# Patient Record
Sex: FEMALE | Race: BLACK OR AFRICAN AMERICAN
[De-identification: names, ages, dates, MRNs, and addresses within clinical notes are randomized per-mention and may not be internally consistent; named-entity substitution may affect disease eponyms.]

---

## 2017-01-06 ENCOUNTER — HOSPITAL ENCOUNTER (EMERGENCY)
Dept: HOSPITAL 17 - NED | Age: 27
LOS: 1 days | Discharge: LEFT BEFORE DISCHARGE | End: 2017-01-07
Payer: MEDICAID

## 2017-01-06 VITALS
DIASTOLIC BLOOD PRESSURE: 110 MMHG | TEMPERATURE: 98.3 F | RESPIRATION RATE: 16 BRPM | HEART RATE: 97 BPM | OXYGEN SATURATION: 97 % | SYSTOLIC BLOOD PRESSURE: 135 MMHG

## 2017-01-06 DIAGNOSIS — J35.9: Primary | ICD-10-CM

## 2017-01-06 PROCEDURE — 99281 EMR DPT VST MAYX REQ PHY/QHP: CPT

## 2017-02-06 ENCOUNTER — HOSPITAL ENCOUNTER (EMERGENCY)
Dept: HOSPITAL 17 - NEPB | Age: 27
Discharge: HOME | End: 2017-02-06
Payer: MEDICAID

## 2017-02-06 VITALS
OXYGEN SATURATION: 98 % | SYSTOLIC BLOOD PRESSURE: 119 MMHG | TEMPERATURE: 98.2 F | HEART RATE: 96 BPM | DIASTOLIC BLOOD PRESSURE: 64 MMHG | RESPIRATION RATE: 18 BRPM

## 2017-02-06 VITALS — HEIGHT: 59 IN | BODY MASS INDEX: 44.44 KG/M2 | WEIGHT: 220.46 LBS

## 2017-02-06 DIAGNOSIS — B34.9: ICD-10-CM

## 2017-02-06 DIAGNOSIS — J02.9: ICD-10-CM

## 2017-02-06 DIAGNOSIS — O26.891: Primary | ICD-10-CM

## 2017-02-06 PROCEDURE — 87081 CULTURE SCREEN ONLY: CPT

## 2017-02-06 PROCEDURE — 87880 STREP A ASSAY W/OPTIC: CPT

## 2017-02-06 PROCEDURE — 99283 EMERGENCY DEPT VISIT LOW MDM: CPT

## 2017-02-06 NOTE — PD
HPI


Chief Complaint:  ENT Complaint


Time Seen by Provider:  10:21


Travel History


International Travel<30 days:  No


Contact w/Intl Traveler<30days:  No


Traveled to known affect area:  No





History of Present Illness


HPI


25-year-old female, 13 weeks pregnant, presents to the emergency Department 

with complaint of sore throat 2 days.  Reports nasal congestion.  Denies fever

, chills.  Denies ear pain or cough.  Denies headache or abdominal pain.  

Denies lump in throat, difficulty swallowing, unusual drooling.  Reports 

painful swallowing.  Reports vomiting secondary to pregnancy.  Denies abdominal 

pain, cramping; vaginal discharge or bleeding.  Has not taken any medications 

or tried any treatments to alleviate her symptoms.  No known relieving factors.

  No known allergies.  Denies significant past medical history.  Is currently 

taking Flagyl for a vaginal infection that was prescribed by her OB/GYN/PCP.  OB

/GYN and PCP is Dr. Andrea.  No other modifying factors or associated signs 

and symptoms.





PFSH


Past Medical History


Medical History:  Denies Significant Hx


Diabetes:  No


Immune Disorder:  No


:  1


Para:  1


Miscarriage:  0


:  0





Past Surgical History


Eye Surgery:  Yes (TEAR DUCT)





Social History


Alcohol Use:  No


Tobacco Use:  No


Substance Use:  No





Allergies-Medications


(Allergen,Severity, Reaction):  


Coded Allergies:  


     No Known Allergies (Unverified , 17)


Reported Meds & Prescriptions





Reported Meds & Active Scripts


Active


Metronidazole 500 Mg Tab 500 Mg PO BID


Reported


Gummi Bear Multivitamin/M (Pediatric Multiple Vitamin W/) 1 Chw Chw   








Review of Systems


Except as stated in HPI:  all other systems reviewed are Neg





Physical Exam


Narrative


GENERAL: Well-nourished, well-developed  female patient, in no 

acute distress


SKIN: Warm and dry.  No rash.


HEAD: Atraumatic. Normocephalic. 


EYES: Pupils equal and round at 3 mm with brisk reaction. No scleral icterus. 

No injection or drainage.  PERRLA. 


ENT: Mucosa pink and dry.  Pharynx with 1-2+ tonsils;  with erythema and edema; 

without exudate.  No Uvular edema. No uvular, palatal, or tonsillar deviation.  

Airway patent. 


EARS: Bilateral pinnae and external canals appear within normal limits. 

Bilateral tympanic membranes without  erythema, dullness or perforation..


NECK: Trachea midline.  No Anterior cervical lymphadenopathy; with tenderness.  


CARDIOVASCULAR: Regular rate and rhythm.  No murmur appreciated.  


RESPIRATORY: No accessory muscle use. Clear to auscultation. Breath sounds 

equal bilaterally. 


GASTROINTESTINAL: Abdomen soft, non-tender, nondistended. Hepatic and splenic 

margins not palpable.  Bowel sounds are active 4 quadrants.  


MUSCULOSKELETAL: No obvious deformities. No clubbing.  No cyanosis.  No edema. 


NEUROLOGICAL: Awake and alert.  Oriented 3.  No obvious cranial nerve 

deficits.  Motor grossly within normal limits. Normal speech.  Moves all 

extremities. 


PSYCHIATRIC: Appropriate mood and affect; insight and judgment normal.





Data


Data


Last Documented VS





Vital Signs








  Date Time  Temp Pulse Resp B/P Pulse Ox O2 Delivery O2 Flow Rate FiO2


 


17 09:55 98.2 96 18 119/64 98 Room Air  








Orders








 Group A Rapid Strep Screen (17 10:24)


Strep Culture (Group A) (17 10:28)











MDM


Medical Decision Making


Medical Screen Exam Complete:  Yes


Emergency Medical Condition:  Yes


Medical Record Reviewed:  Yes


Differential Diagnosis


Viral pharyngitis, strep pharyngitis, viral illness


Narrative Course


26-year-old female that reports being 13 weeks pregnant presents with complaint 

of sore throat.  Afebrile in the ER.  Patient denies fever, chills.  Denies 

lump in throat, unusual drooling, difficulty swallowing.  Oropharynx is 

erythematous and mildly edematous without exudate.  Denies cough.  Centor Score

:  Using Centor score for management of sore throat the patient's risk of GABHS 

pharyngitis is Score 3=risk of GABHS 28-35%=perform rapid strep swab.  Rapid 

strep ordered.


1046:  Rapid strep negative.  Discussed symptom treatment with patient and she 

verbalized understanding and agreement with treatment plan.  Patient is 

medically cleared and stable for discharge.  Discussed reasons to return to the 

emergency department.  Instructed patient to follow up with primary care 

provider.  Patient agrees with treatment plan.  The patients vital signs are 

stable and the patient is stable for outpatient follow-up and treatment.  

Patient discharged home, stable and in no acute distress.





Diagnosis





 Primary Impression:  


 Viral pharyngitis


Referrals:  


Primary Care Physician


Patient Instructions:  General Instructions, Pharyngitis (ED)


Departure Forms:  Tests/Procedures, Work Release   Enter return to work date:  

2017





***Additional Instructions:


Get plenty of sleep/rest


Rest your voice


Drink plenty of fluids to prevent dehydration


Use warm saltwater gargles to soothe throat pain


Use an air humidifier/turn off ceiling fans


Use throat lozenges as needed for sore throat


Use ibuprofen or acetaminophen as needed to relieve pain and fever


Follow-up with your primary care provider within 2-4 days


Return immediately to the emergency department with worsening of symptoms


***Med/Other Pt SpecificInfo:  Prescription(s) given


Disposition:  01 DISCHARGE HOME


Condition:  Stable








Kamilla Escamilla 2017 10:24

## 2017-02-07 ENCOUNTER — HOSPITAL ENCOUNTER (OUTPATIENT)
Dept: HOSPITAL 17 - HPND | Age: 27
End: 2017-02-07
Attending: FAMILY MEDICINE
Payer: MEDICAID

## 2017-02-07 DIAGNOSIS — Z34.90: Primary | ICD-10-CM

## 2017-02-07 PROCEDURE — 76813 OB US NUCHAL MEAS 1 GEST: CPT

## 2017-02-07 PROCEDURE — 36416 COLLJ CAPILLARY BLOOD SPEC: CPT

## 2017-03-03 ENCOUNTER — HOSPITAL ENCOUNTER (EMERGENCY)
Dept: HOSPITAL 17 - NEPB | Age: 27
Discharge: HOME | End: 2017-03-03
Payer: MEDICAID

## 2017-03-03 VITALS
TEMPERATURE: 98 F | DIASTOLIC BLOOD PRESSURE: 77 MMHG | SYSTOLIC BLOOD PRESSURE: 131 MMHG | RESPIRATION RATE: 18 BRPM | OXYGEN SATURATION: 99 % | HEART RATE: 98 BPM

## 2017-03-03 VITALS — BODY MASS INDEX: 45.33 KG/M2 | HEIGHT: 59 IN | WEIGHT: 224.87 LBS

## 2017-03-03 DIAGNOSIS — J06.9: Primary | ICD-10-CM

## 2017-03-03 PROCEDURE — 87804 INFLUENZA ASSAY W/OPTIC: CPT

## 2017-03-03 PROCEDURE — 99283 EMERGENCY DEPT VISIT LOW MDM: CPT

## 2017-03-03 NOTE — PD
HPI


Chief Complaint:  Cold / Flu Symptoms


Time Seen by Provider:  04:23


Travel History


International Travel<30 days:  No


Contact w/Intl Traveler<30days:  No


Traveled to known affect area:  No





History of Present Illness


HPI


26-year-old white female who is 16 weeks pregnant presents with complaints of 

runny nose, cough and congestion for last 2-3 days.  She states that the 

coughing causing her to feel nauseous and one of vomiting.  She does state that 

she does normally have nausea due to her pregnancy.  She is not taking any 

medications currently.  She denies any shortness of breath or wheezing.  No 

abdominal pain or vaginal bleeding.  No leakage of fluid.  No dysuria or 

frequency.  No myalgias or arthralgias.





PFSH


Past Medical History


Medical History:  Denies Significant Hx


Diabetes:  No


Diminished Hearing:  No


Immune Disorder:  No


Tetanus Vaccination:  < 5 Years


Pregnant?:  Pregnant


LMP:  16


:  1


Para:  1


Miscarriage:  0


:  0





Past Surgical History


Eye Surgery:  Yes (TEAR DUCT)





Social History


Alcohol Use:  No


Tobacco Use:  No


Substance Use:  No





Allergies-Medications


(Allergen,Severity, Reaction):  


Coded Allergies:  


     No Known Allergies (Unverified , 3/3/17)


Reported Meds & Prescriptions





Reported Meds & Active Scripts


Active


Reported


Gummi Bear Multivitamin/M (Pediatric Multiple Vitamin W/) 1 Chw Chw   








Review of Systems


Except as stated in HPI:  all other systems reviewed are Neg





Physical Exam


Narrative


GENERAL:  Well-developed, well-nourished in no acute distress. Nontoxic 

appearing.


HEAD: Normocephalic, no pain on percussion of the sinuses


EYES: Pupils equal round and reactive. Extraocular motions intact. No scleral 

icterus. No injection or drainage. 


ENT: TMs clear without erythema.  The external auditory canals clear.  Nose: 

clear nasal discharge with edema to the turbinates.  Posterior pharynx is pink 

and moist.  No tonsillar edema or exudate.  Uvula midline. Airway patent.


NECK: Trachea midline.Supple, nontender, moves head freely.  No central bony 

tenderness or spasm.


CARDIOVASCULAR: Regular rate and rhythm without murmurs, gallops, or rubs. 


RESPIRATORY: Clear to auscultation. Breath sounds equal bilaterally. No wheezes

, rales, or rhonchi.  


GASTROINTESTINAL: Abdomen soft, obese, non-tender, nondistended. No hepato-

splenomegaly, or palpable masses. No guarding.


EXTREMITIES: No clubbing, cyanosis, or edema. No joint tenderness, effusion, or 

edema noted. 


BACK: Nontender without deformity or crepitance. No flank tenderness.





Data


Data


Last Documented VS





Vital Signs








  Date Time  Temp Pulse Resp B/P Pulse Ox O2 Delivery O2 Flow Rate FiO2


 


3/3/17 02:45 98.0 98 18 131/77 99 Room Air  








Orders





 Influenzae A/B Antigen (3/3/17 03:32)








MDM


Medical Decision Making


Medical Screen Exam Complete:  Yes


Emergency Medical Condition:  Yes


Medical Record Reviewed:  Yes


Interpretation(s)


Influenza: Negative


Differential Diagnosis


MDM: High


Differential diagnoses: Pneumonia, bronchitis, URI, asthma, RAD, legionnaire's 

disease, SARS, ARDS, influenza, bronchiolitis, RSV,PE,CHF


Narrative Course


Patient's influenza is negative.  Her symptoms are consistent with upper 

respiratory tract infection.  This is viral in etiology.





Diagnosis





 Primary Impression:  


 Viral URI with cough


Patient Instructions:  General Instructions





***Additional Instructions:


Rest.


Increase fluids.


Tylenol for fever.


Sudafed.


Benadryl for nausea.


Follow-up with your OB doctor on Monday.


Return to the ER for emergencies.


***Med/Other Pt SpecificInfo:  No Meds Exist/No RX given


Disposition:   DISCHARGE HOME


Condition:  Stable








Tc Fang Mar 3, 2017 04:28

## 2017-03-13 ENCOUNTER — HOSPITAL ENCOUNTER (OUTPATIENT)
Dept: HOSPITAL 17 - HPND | Age: 27
End: 2017-03-13
Attending: FAMILY MEDICINE
Payer: MEDICAID

## 2017-03-13 DIAGNOSIS — O99.212: Primary | ICD-10-CM

## 2017-03-13 DIAGNOSIS — E66.01: ICD-10-CM

## 2017-03-13 DIAGNOSIS — Z3A.00: ICD-10-CM

## 2017-03-13 DIAGNOSIS — O34.212: ICD-10-CM

## 2017-03-13 PROCEDURE — 76811 OB US DETAILED SNGL FETUS: CPT

## 2017-03-17 ENCOUNTER — HOSPITAL ENCOUNTER (EMERGENCY)
Dept: HOSPITAL 17 - NEPA | Age: 27
Discharge: HOME | End: 2017-03-17
Payer: MEDICAID

## 2017-03-17 VITALS — SYSTOLIC BLOOD PRESSURE: 124 MMHG | DIASTOLIC BLOOD PRESSURE: 68 MMHG

## 2017-03-17 VITALS
DIASTOLIC BLOOD PRESSURE: 74 MMHG | RESPIRATION RATE: 20 BRPM | SYSTOLIC BLOOD PRESSURE: 133 MMHG | TEMPERATURE: 98.5 F | HEART RATE: 98 BPM | OXYGEN SATURATION: 100 %

## 2017-03-17 VITALS — WEIGHT: 220.46 LBS | BODY MASS INDEX: 44.44 KG/M2 | HEIGHT: 59 IN

## 2017-03-17 DIAGNOSIS — M71.21: Primary | ICD-10-CM

## 2017-03-17 PROCEDURE — 93971 EXTREMITY STUDY: CPT

## 2017-03-17 NOTE — RADRPT
EXAM DATE/TIME:  03/17/2017 12:13 

 

[facility] COMPARISON:     

No previous studies available for comparison.

        

 

 

INDICATIONS :                

Right leg pain.

            

 

MEDICAL HISTORY :     

Pregnancy.      

 

SURGICAL HISTORY :         

Eye surgery.

 

ENCOUNTER:     

Initial

 

ACUITY:     

1 day

 

PAIN SCORE:      

10/10

 

LOCATION:      

Right  leg.

            

                      

 

TECHNIQUE:     

Venous ultrasound of the leg was performed from the inguinal ligament to the proximal calf.  Real-stan
e, color Doppler and spectral tracing, compression and augmentation techniques were used.  

 

FINDINGS:     

There is normal compressibility of the deep venous system from the inguinal region to the proximal ca
lf.  No echogenic clot is seen in the lumen of the common femoral, femoral, popliteal, and posterior 
tibial veins.  There is a normal response of the venous system to proximal and distal augmentation an
d respiration.  

 

CONCLUSION:     No DVT.

 

 

 

 Bud Pham MD on March 17, 2017 at 12:31           

Board Certified Radiologist.

 This report was verified electronically.

## 2017-03-17 NOTE — PD
HPI


Chief Complaint:  Pain: Acute or Chronic


Time Seen by Provider:  11:33


Travel History


International Travel<30 days:  No


Contact w/Intl Traveler<30days:  No


Traveled to known affect area:  No





History of Present Illness


HPI


Patient 26-year-old female presents to the emergency room for evaluation of 

right lower extremity pain.  Patient states since yesterday she's had a painful 

spot behind her right knee which hurts her whenever she stands.  Patient states 

she's never had something like this before.  Denies any fever shortness of 

breath.  States the pain radiates up into her thigh.





PFSH


Past Medical History


Medical History:  Denies Significant Hx


Diabetes:  No


Diminished Hearing:  No


Immune Disorder:  No


Pregnant?:  Pregnant


LMP:  2016


:  1


Para:  1


Miscarriage:  0


:  0





Past Surgical History


Surgical History:  No Previous Surgery


Eye Surgery:  Yes (TEAR DUCT)





Social History


Alcohol Use:  No


Tobacco Use:  No


Substance Use:  No





Allergies-Medications


(Allergen,Severity, Reaction):  


Coded Allergies:  


     No Known Allergies (Unverified , 3/3/17)


Reported Meds & Prescriptions





Reported Meds & Active Scripts


Active


Reported


Gummi Bear Multivitamin/M (Pediatric Multiple Vitamin W/) 1 Chw Chw   








Review of Systems


Except as stated in HPI:  all other systems reviewed are Neg





Physical Exam


Narrative


GENERAL: Overweight, well-developed patient,  In no apparent distress. 


SKIN: Warm and dry.


HEAD: Normocephalic.


EYES: No scleral icterus. No injection or drainage. 


NECK: Supple, trachea midline. No JVD or lymphadenopathy.


CARDIOVASCULAR: Regular rate and rhythm without murmurs, gallops, or rubs. 


RESPIRATORY: Breath sounds equal bilaterally. No accessory muscle use.


GASTROINTESTINAL: Abdomen soft, non-tender, nondistended. 


MUSCULOSKELETAL: No cyanosis, or edema.  There is a small nonindurated area of 

the right knee in the popliteal fossa more on the medial aspect.  No overlying 

erythema.  Minimally tender to palpation.  Full nontender range of motion of 

the right knee hip and ankle.  She is ambulatory in the emergency department.  

She has an atraumatic normal left lower extremity exam.


BACK: Nontender without obvious deformity. No CVA tenderness.





Data


Data


Last Documented VS





Vital Signs








  Date Time  Temp Pulse Resp B/P Pulse Ox O2 Delivery O2 Flow Rate FiO2


 


3/17/17 14:03  85 16 124/68 100   


 


3/17/17 11:17 98.5     Room Air  








Orders





 Us Leg Venous Doppler (3/17/17 11:33)








MDM


Medical Decision Making


Medical Screen Exam Complete:  Yes


Emergency Medical Condition:  Yes


Differential Diagnosis


Baker's cyst, DVT less likely, abscess extremely likely.


Narrative Course


Given the patient has some radiation up into her thigh we'll get a DVT 

examination which is negative.  Discussed with patient's of the medic 

management return to ED criteria.  Follow-up with primary care physician.





Diagnosis





 Primary Impression:  


 Baker cyst


 Qualified Code:  M71.21 - Baker cyst, right


Disposition:  01 DISCHARGE HOME


Condition:  Stable








Prabhakar Ritter MD Mar 17, 2017 11:35

## 2017-04-04 ENCOUNTER — HOSPITAL ENCOUNTER (OUTPATIENT)
Dept: HOSPITAL 17 - HPND | Age: 27
End: 2017-04-04
Attending: FAMILY MEDICINE
Payer: MEDICAID

## 2017-04-04 DIAGNOSIS — O34.212: ICD-10-CM

## 2017-04-04 DIAGNOSIS — O99.212: Primary | ICD-10-CM

## 2017-04-04 PROCEDURE — 76816 OB US FOLLOW-UP PER FETUS: CPT

## 2017-05-08 ENCOUNTER — HOSPITAL ENCOUNTER (OUTPATIENT)
Dept: HOSPITAL 17 - HPND | Age: 27
End: 2017-05-08
Attending: FAMILY MEDICINE
Payer: MEDICAID

## 2017-05-08 DIAGNOSIS — O99.212: Primary | ICD-10-CM

## 2017-05-08 DIAGNOSIS — O35.1XX0: ICD-10-CM

## 2017-05-08 DIAGNOSIS — E66.01: ICD-10-CM

## 2017-05-08 PROCEDURE — 76816 OB US FOLLOW-UP PER FETUS: CPT

## 2017-06-05 ENCOUNTER — HOSPITAL ENCOUNTER (OUTPATIENT)
Dept: HOSPITAL 17 - HPND | Age: 27
End: 2017-06-05
Attending: FAMILY MEDICINE
Payer: MEDICAID

## 2017-06-05 DIAGNOSIS — E66.01: ICD-10-CM

## 2017-06-05 DIAGNOSIS — Z3A.30: ICD-10-CM

## 2017-06-05 DIAGNOSIS — O99.213: Primary | ICD-10-CM

## 2017-06-05 PROCEDURE — 76816 OB US FOLLOW-UP PER FETUS: CPT

## 2017-06-14 ENCOUNTER — HOSPITAL ENCOUNTER (EMERGENCY)
Dept: HOSPITAL 17 - HOBED | Age: 27
Discharge: HOME | End: 2017-06-14
Payer: MEDICAID

## 2017-06-14 VITALS — RESPIRATION RATE: 18 BRPM

## 2017-06-14 DIAGNOSIS — O47.03: Primary | ICD-10-CM

## 2017-06-14 DIAGNOSIS — O34.219: ICD-10-CM

## 2017-06-14 DIAGNOSIS — Z3A.31: ICD-10-CM

## 2017-06-14 PROCEDURE — 99283 EMERGENCY DEPT VISIT LOW MDM: CPT

## 2017-06-14 NOTE — PD
HPI


Chief Complaint


Contractions


Travel History


International Travel<30 Days:  No


Contact w/Intl Traveler<30Days:  No


Known Affected Area:  No





History of Present Illness


HPI


 at 31w 5d presents with c/o contractions.  Reports driving earlier this 

evening and feeling occasional contractions.  Denies LOF/VB.  Reports good FM.


Currently taking antibiotics for a UTI.


Para:  2


:  3


Last Menstrual Period:  2017





History


Past Medical History


Medical History:  Denies Significant Hx





Obstetric History


Obstetric History


FT C/S x 2





Past Surgical History


*** Narrative Surgical


C/S x 2





Family History


Family History:  Negative





Social History


Alcohol Use:  No


Tobacco Use:  No


Substance Abuse:  No





Allergies-Medications


(Allergen,Severity, Reaction):  


Coded Allergies:  


     No Known Allergies (Unverified , 17)


Home Meds


Active Scripts


Nitrofurantoin Macrocrystal 100 Mg [id number] Mg PO QID  #28 CAP  Ref 0


   Prov:Yvrose Andrea MD R1         17


Pyridoxine 25 Mg Tab25 Mg PO Q8HR PRN (NAUSEA OR VOMITING) #30 TAB  Ref 11


   Prov:Yvrose Andrea MD R1         5/3/17


Reported Medications


Pediatric Multiple Vitamin W/ (Gummi Bear Multivitamin/M)1 Chw Chw 


   17





Review of Systems


Except as stated in HPI:  all other systems reviewed are Neg





Physical Exam


AFVSS


Narrative


GENERAL: Well-nourished, well-developed patient.


SKIN: Warm and dry.


HEAD: Normocephalic and atraumatic.


EYES: No scleral icterus. No injection or drainage. 


ENT: No nasal drainage noted. Mucous membranes pink. Airway patent.


NECK: Supple, trachea midline. No JVD.


CARDIOVASCULAR: Regular rate and rhythm without murmurs, gallops, or rubs. 


RESPIRATORY: Breath sounds equal bilaterally. No accessory muscle use.


BREASTS: Bilateral exam showed no masses , no retractions, no nipple discharge.


ABDOMEN/GI: Abdomen soft, non-tender, bowel sounds present, no rebound, no 

guarding 


   Gravid to [-] weeks size


   Fundal Height: [-]


GENITOURINARY: 


   External Genitalia: intact and normal in appearance


   BUS glands: [-]


   Cervix: [per RN exam]


   Dilatation: [0]          


   Effacement: [0]          


   Station: [3]  


   Presentation: [-]        


   Membranes: [intact or ruptured]


   Uterine Contractions: [none]


FHT's: 


   Category: [1]   


   Baseline: [130]   


   Reactive: [yes]   


   Variability: [moderate]  


   Decels: [none]  


EXTREMITIES: No cyanosis or edema.


BACK: Nontender without obvious deformity. No CVA tenderness.


NEUROLOGICAL: Awake and alert. Motor and sensory grossly within normal limits. 

Five out of 5 muscle strength in all muscle groups. Normal speech.





MDM


Interpretation(s)


 at 31w 5d with false labor, UTI.


Plan


Continue antibiotics for UTI.   labor precautions given.  Close f/u with 

OB provider encouraged.  Increase hydration.


Return if symptoms return or worsen.  All questions answered.


Diagnosis


Diagnosis:  


 Primary Impression:  


 31 weeks gestation of pregnancy


 Additional Impressions:  


 False labor before 37 completed weeks of gestation during pregnancy in third 

trimester, antepartum


 Previous  section


Disposition:  01 DISCHARGE HOME


Condition:  Stable








Juana Arvizu MD 2017 04:43

## 2017-06-14 NOTE — PD
HPI


Chief Complaint


Contractions


Date Seen:  2017


Time Seen:  04:30 (Bubba Holder MD R1)





Travel History


International Travel<30 Days:  No


Contact w/Intl Traveler<30Days:  No


Known Affected Area:  No (Bubba Holder MD R1)





History of Present Illness


HPI


Georgette Stiles is a very pleasant 27 year old  at 31 and 5/7 weeks 

gestation who presents to the OB ED with complaints of contractions. She states 

she was picking up her boyfriend around 02:30 this AM and felt a contraction 

while in her car. She states she has felt about 6 contractions total since then 

that have been irregular. Denies LOF or VB. Reports +FM. Denies headache, 

lightheadedness, RUQ pain, visual symptoms. She was given a prescription for 

nitrofurantoin 100mg po qid for 7 days after her urine dipstick on  showed 

100+ plus protein and large leukocyte. She reports being compliant with this 

thus far.





Prenatal labs: HIV negative, syphilis negative, GC/Chlamydia negative, HepB 

negative, Rubella immune, Hgb 10.9/Hct 34.7, Pap smear negative for 

intraepithelial lesion or malignancy in 


Para:  2


:  3 (Bubba Holder MD R1)





History


Past Medical History


*** Narrative Medical


History of chlamydia, treated (Bubba Holder MD R1)





Obstetric History


Obstetric History


: 2012, 41 weeks, female,  @ [place], emergency  

secondary to nonreassuring fetal heart tones


G2: 14, 40 weeks, female, repeat  @ [place] (Bubba Holder MD 

R1)





Past Surgical History


*** Narrative Surgical


CXN x2


Tear duct surgery (Bubba Holder MD R1)





Family History


Family History:  Negative (Bubba Holder MD R1)





Social History


Alcohol Use:  No


Tobacco Use:  No


Substance Abuse:  No (Bubba Holder MD R1)





Allergies-Medications


(Allergen,Severity, Reaction):  


Coded Allergies:  


     No Known Allergies (Unverified , 17)


Home Meds


Active Scripts


Nitrofurantoin Macrocrystal 100 Mg [id number] Mg PO QID  #28 CAP  Ref 0


   Prov:Yvrose Andrea MD R1         17


Pyridoxine 25 Mg Tab25 Mg PO Q8HR PRN (NAUSEA OR VOMITING) #30 TAB  Ref 11


   Prov:Yvrose Andrea MD R1         5/3/17


Reported Medications


Pediatric Multiple Vitamin W/ (Gummi Bear Multivitamin/M)1 Chw Chw 


   17





Review of Systems


Except as stated in HPI:  all other systems reviewed are Neg (Bubba Holder MD R1)





Physical Exam


Narrative


GENERAL: Well-nourished, well-developed patient.


SKIN: Warm and dry.


HEAD: Normocephalic and atraumatic.


EYES: No scleral icterus. No injection or drainage. 


ENT: No nasal drainage noted. Mucous membranes pink. Airway patent.


NECK: Supple, trachea midline. No JVD.


CARDIOVASCULAR: Regular rate and rhythm without murmurs, gallops, or rubs. 


RESPIRATORY: Breath sounds equal bilaterally. No accessory muscle use.


ABDOMEN/GI: Abdomen soft, non-tender, bowel sounds present, no rebound, no 

guarding 


   Gravid to 32 weeks size


GENITOURINARY: 


   External Genitalia: [-]


   Cervix: posterior


   Dilatation: 0          


   Effacement: 0          


   Station: [-]  


   Presentation: [-]        


   Membranes: [-]


   Uterine Contractions: none on tocometer


FHT's: 


   Category: I   


   Baseline: 130s   


   Reactive: yes   


   Variability: mod  


   Decels: none  


EXTREMITIES: No cyanosis or edema.


BACK: Nontender without obvious deformity. No CVA tenderness.


NEUROLOGICAL: Awake and alert. Motor and sensory grossly within normal limits. 

Normal speech. (Bubba Holder MD R1)





Data


Data


Vital Signs Reviewed:  Yes (Bubba Holder MD R1)





[facility]


Medical Record Reviewed:  Yes


Plan


Very pleasant 27 year old  at 31 and 5/7 weeks gestation presents to OB ED 

with complaints of contractions.





1. IUP


- Category I fetal tracing, reassuring


- No contractions on tocometer


- Cervix closed


- VSS


- Encouraged oral hydration


- Counseled patient on signs of early labor and when to present back to the OB 

ED


- Follow up with PCP Dr. Yvrose Andrea at next routine visit (Bubba Holder MD R1)





Diagnosis


Diagnosis:  


 Primary Impression:  


 [place] [facility] contraction


Disposition:  01 DISCHARGE HOME


Condition:  Stable





Attestation


Patient seen and examined.  Agree with resident's plan. (Juana Arvizu MD)








Bubba Holder MD R1 2017 04:48


Juana Arvizu MD 2017 07:48

## 2017-06-24 ENCOUNTER — HOSPITAL ENCOUNTER (EMERGENCY)
Dept: HOSPITAL 17 - HOBED | Age: 27
Discharge: HOME | End: 2017-06-24
Payer: MEDICAID

## 2017-06-24 DIAGNOSIS — O26.893: Primary | ICD-10-CM

## 2017-06-24 DIAGNOSIS — Z3A.33: ICD-10-CM

## 2017-06-24 DIAGNOSIS — O23.43: ICD-10-CM

## 2017-06-24 DIAGNOSIS — Z79.899: ICD-10-CM

## 2017-06-24 DIAGNOSIS — M54.9: ICD-10-CM

## 2017-06-24 PROCEDURE — 59025 FETAL NON-STRESS TEST: CPT

## 2017-06-24 NOTE — PD
HPI


Chief Complaint


back pain


Date Seen:  2017


Travel History


International Travel<30 Days:  No


Contact w/Intl Traveler<30Days:  No


Known Affected Area:  No





History of Present Illness


HPI


This is a 26y/o  at 33w3d who presents to the [facility] with reports of 

intermittent back pain for the last few weeks.  The pain is usually preceded by 

2 hours of contractions.  She denies vaginal bleeding or leakage of fluid with 

reports of active fetal movements.





Prenatal care with Dr. Andrea in the resident clinic, care complicated by: 


1. Choroid plexus cyst in the fetus, since resolved.


2. Previous c/s times 2


3. current UTI on macrobid


Para:  2


:  3





History


Past Medical History


Medical History:  Denies Significant Hx





Obstetric History


Obstetric History


2012 41w Primary c/s, emergent, Female


2014 40w Repeat c/s, Female





Past Surgical History


*** Narrative Surgical


C/s times 2


Tear duct surgery as a child





Family History


Family History:  Negative





Social History


Alcohol Use:  No


Tobacco Use:  No


Substance Abuse:  No





Allergies-Medications


(Allergen,Severity, Reaction):  


Coded Allergies:  


     No Known Allergies (Unverified , 17)


Home Meds


Active Scripts


Nitrofurantoin Macrocrystal 100 Mg Lnp294 Mg PO QID  #28 CAP  Ref 0


   Prov:Yvrose Andrea MD R1         17


Pyridoxine 25 Mg Tab25 Mg PO Q8HR PRN (NAUSEA OR VOMITING) #30 TAB  Ref 11


   Prov:Yvrose Andrea MD R1         5/3/17


Reported Medications


Pediatric Multiple Vitamin W/ (Gummi Bear Multivitamin/M)1 Chw Chw 


   17





Review of Systems


Except as stated in HPI:  all other systems reviewed are Neg





Physical Exam


Narrative


GENERAL: Well-nourished, well-developed patient.


SKIN: Warm and dry.


HEAD: Normocephalic and atraumatic.


EYES: No scleral icterus. No injection or drainage. 


ENT: No nasal drainage noted. Mucous membranes pink. Airway patent.


NECK: Supple, trachea midline. No JVD.


CARDIOVASCULAR: Regular rate and rhythm without murmurs, gallops, or rubs. 


RESPIRATORY: Breath sounds equal bilaterally. No accessory muscle use.


BREASTS: Bilateral exam showed no masses , no retractions, no nipple discharge.


ABDOMEN/GI: Abdomen soft, non-tender, bowel sounds present, no rebound, no 

guarding 


   Gravid to 33 weeks size


GENITOURINARY: 


   External Genitalia: intact and normal in appearance


   VE: deferred


   Uterine Contractions: None


FHT's: 


   Category: 1  


EXTREMITIES: No cyanosis or edema.


BACK: Nontender without obvious deformity. No CVA tenderness.


NEUROLOGICAL: Awake and alert. Motor and sensory grossly within normal limits. 

Five out of 5 muscle strength in all muscle groups. Normal speech.





Data


Data


Vital Signs Reviewed:  Yes


Orders





 Vital Signs (Adult) .ON ADMISSION (17 15:01)


^ Labor Status (17 15:01)


^ Non Stress Test (17 15:01)





MDM


Medical Record Reviewed:  No


Narrative Course / MDM


This is a 26y/o  at 33w3d with back pain for a few weeks.


-reassuring fetal status


-no evidence of PTL


-currently on macrobid for UTI


Plan


-d/c home


-f/u with Dr. Andrea as scheduled tomorrow


Diagnosis


Diagnosis:  


 Primary Impression:  


 33 weeks gestation of pregnancy


 Additional Impression:  


 Back pain affecting pregnancy in third trimester


Disposition:  01 DISCHARGE HOME


Condition:  Stable


Patient Instructions:  Having Your Baby: The Labor Process (GEN), Urinary Tract 

Infection in Pregnancy (ED), Abdominal Pain in Pregnancy  (ED), General 

Instructions


Departure Forms:  Tests/Procedures








Meli Galvan MD 2017 15:11

## 2017-07-15 ENCOUNTER — HOSPITAL ENCOUNTER (EMERGENCY)
Dept: HOSPITAL 17 - HOBED | Age: 27
Discharge: HOME | End: 2017-07-15
Payer: MEDICAID

## 2017-07-15 DIAGNOSIS — R10.9: ICD-10-CM

## 2017-07-15 DIAGNOSIS — O26.893: Primary | ICD-10-CM

## 2017-07-15 DIAGNOSIS — Z3A.36: ICD-10-CM

## 2017-07-15 DIAGNOSIS — O34.219: ICD-10-CM

## 2017-07-15 PROCEDURE — 99284 EMERGENCY DEPT VISIT MOD MDM: CPT

## 2017-07-15 PROCEDURE — 96372 THER/PROPH/DIAG INJ SC/IM: CPT

## 2017-07-15 NOTE — PD
HPI


Chief Complaint


abdominal pain


Date Seen:  Jul 15, 2017


Time Seen:  17:59


Travel History


International Travel<30 Days:  No


Contact w/Intl Traveler<30Days:  No





History of Present Illness


HPI


Patient is a 27 year of  at 36 and 1/7 weeks gestation, EDC 2017, who 

presents to the [facility] with abdominal pain. She denies leakage of fluid, vaginal 

bleeding, and contractions. She feels baby moving regularly. She denies HA/N/V/D

/fever/sick contacts/SOB/calf pain/dizziness/seeing spots. OB care is with Dr. Yvrose Andrea, LOV 2017 at the [facility]. She had no complaints at the visit 

yesterday. 





She does have urine sample from visit 2017 showing 10-50K yeast, with 

recommendation to get Monistat ointment OTC for treatment. She has not done 

this yet.





History


Past Medical History


Medical History:  Denies Significant Hx





Past Surgical History


*** Narrative Surgical


Cs x 2





Family History


Family History:  Negative





Social History


Alcohol Use:  No


Tobacco Use:  No


Substance Abuse:  No





Allergies-Medications


(Allergen,Severity, Reaction):  


Coded Allergies:  


     No Known Allergies (Unverified , 17)


Home Meds


Active Scripts


Miconazole 3 Vaginal Cream (Monistat 3 Vaginal Cream)4 % Cream1 Appl VAGINAL HS

  #1 BOX  Ref 0


   Apply 1 applicator to the vaginal area at bedtime. Repeat for 3 days.


   Prov:Yvrose Andrea MD R1         17


Pyridoxine 25 Mg Tab25 Mg PO Q8HR PRN (NAUSEA OR VOMITING) #30 TAB  Ref 11


   Prov:Yvrose Andrea MD R1         5/3/17


Reported Medications


Pediatric Multiple Vitamin W/ (Gummi Bear Multivitamin/M)1 Chw Chw 


   17





Review of Systems


Except as stated in HPI:  all other systems reviewed are Neg





Physical Exam


Narrative


GENERAL: Well-nourished, well-developed patient. Obese and in no apparent 

distress.


SKIN: Warm and dry. No rashes


HEAD: Normocephalic and atraumatic.


EYES: No scleral icterus. No injection or drainage. 


ENT: No nasal drainage noted. Mucous membranes pink. Airway patent.


NECK: Supple, trachea midline. No JVD.


CARDIOVASCULAR: Regular rate and rhythm without murmurs, gallops, or rubs. 


RESPIRATORY: Breath sounds equal bilaterally. No accessory muscle use.


ABDOMEN/GI: Abdomen soft, obese, gravid. Nontender to palpation. Normal bowel 

sounds. No rebound, no guarding 


GENITOURINARY: no external lesions


   External Genitalia: intact and normal in appearance


   Cervix: closed, thick, soft


   Presentation: vertx       


   Membranes: -


   Uterine Contractions: absent


FHT's: 


   Category: 1   


   Baseline: 140 


   Reactive: 170   


   Variability: mod


   Decels: [absent]  


EXTREMITIES: No cyanosis or edema.


BACK: Nontender without obvious deformity. No CVA tenderness.


NEUROLOGICAL: Awake and alert. Motor and sensory grossly within normal limits. 

Five out of 5 muscle strength in all muscle groups. Normal speech. 2+ reflexes.





Data


Data


Vital Signs Reviewed:  Yes


Orders





 Vital Signs (Adult) .ON ADMISSION (7/15/17 17:49)


^ Labor Status (7/15/17 17:49)


^ Non Stress Test (7/15/17 17:49)


^ Hydration (7/15/17 17:49)


Acetaminophen (Tylenol) (7/15/17 18:00)





MDM


Medical Record Reviewed:  Yes (102/61, P110, R18, T 98.3F)


Narrative Course / MDM


27-year-old  at 36 and 1/7 weeks gestation who presents to the OB ED with 

abdominal pain.


 


Intrauterine Pregnancy:


Category 1 tracing


No contractions


Vaginal exam shows no signs of labor


Patient desires repeat 





Abdominal pain:


Likely musculoskeletal given location


Vital signs were within normal limits a low likelihood of other systemic cause


Tylenol 650 mg 1 PO


Fentanyl 50mg x 1 IV


If symptoms improve, will discharge home


Recommended patient to rest and consider using an abdominal binder or similar 

apparatus to stabilize the abdomen





History of  2:


 consult scheduled for  at 2 PM


Patient was reminded the importance to attend this session


Follow-up appointment with me on 





Patient was discussed with Dr. Woods


Diagnosis


Diagnosis:  


 Primary Impression:  


 36 weeks gestation of pregnancy


 Additional Impressions:  


 Previous  section


 Abdominal pain affecting pregnancy


Disposition:  01 DISCHARGE HOME


Condition:  Stable


Patient Instructions:  Abdominal Pain in Pregnancy  (ED), General Instructions, 

Narcotic given in the ED








Yvrose Andrea MD R1 Jul 15, 2017 18:13

## 2017-07-24 ENCOUNTER — HOSPITAL ENCOUNTER (EMERGENCY)
Dept: HOSPITAL 17 - HOBED | Age: 27
LOS: 1 days | Discharge: HOME | End: 2017-07-25
Payer: MEDICAID

## 2017-07-24 DIAGNOSIS — O26.899: Primary | ICD-10-CM

## 2017-07-24 DIAGNOSIS — Z3A.37: ICD-10-CM

## 2017-07-24 DIAGNOSIS — R10.2: ICD-10-CM

## 2017-07-24 DIAGNOSIS — Z79.899: ICD-10-CM

## 2017-07-24 PROCEDURE — 59025 FETAL NON-STRESS TEST: CPT

## 2017-07-24 NOTE — PD
HPI


Chief Complaint


vaginal pain


Date Seen:  2017


Travel History


International Travel<30 Days:  No


Contact w/Intl Traveler<30Days:  No





History of Present Illness


HPI


Ms. Stiles is a 27-year-old female  patient of Dr. Yvrose Andrea at 37 

4/7 weeks (JESUS MANUEL of 17 based on LMP and ultrasound) who presents with 

symptoms of vaginal pain. 


Ms. Stiles reports that pain in vaginal area began at approximately 5 PM 

yesterday evening; patient reports that it is on the sides of her vagina and 

near the "bone;" she describes as throbbing in nature. She states that it does 

not seem to be burning, is not associated with urination, and is not associated 

with vaginal discharge, vaginal bleeding, or loss of vaginal fluid.  Patient 

states that vaginal pain is constant rather than intermittent.  Patient reports 

that she has had previous upper abdominal pain but that she has not had this 

recently. Patient reports history of vomiting for prolonged duration [per EMR, 

this has been reported chronically at patient's prenatal care visits]. Patient 

does not feel that she is having contractions. 


Patient reports that prenatal history has been largely unremarkable.  Patient 

reports normal fetal movement.  Patient does not report headache, visual changes

, shortness of breath, or chest pain.  Patient reports swelling in her feet.  

Patient reports that she has had diarrhea for the past 3 days.   


Patient states that she had UTI that was treated 2017 with Macrobid; she has 

not had any urinary symptoms since.  Patient does not report any difficulties 

with sexual transmitted diseases; she states she has not been sexually active 

since becoming pregnant. [Patient with history of chlamydia per EMR; 

subsequently tested negative].


Prenatal labs reviewed; unremarkable


Patient has scheduled repeat CS at 39 weeks (2017).


Para:  2


:  3





History


Past Medical History


*** Narrative Medical


Per Patient- None





Per EMR


History of chlamydia, treated





Obstetric History


Obstetric History





CS- full term in  for fetal distress


CS- full term in  (repeat)





Past Surgical History


*** Narrative Surgical


CS- full term in  for fetal distress


CS- full term in  (repeat)





Family History


Family History:  Negative





Social History


Alcohol Use:  No


Tobacco Use:  No


Substance Abuse:  No





Allergies-Medications


(Allergen,Severity, Reaction):  


Coded Allergies:  


     No Known Allergies (Unverified , 17)


Home Meds


Active Scripts


Miconazole 3 Vaginal Cream (Monistat 3 Vaginal Cream)4 % Cream1 Appl VAGINAL HS

  #1 BOX  Ref 0


   Apply 1 applicator to the vaginal area at bedtime. Repeat for 3 days.


   Prov:Yvrose Andrea MD R1         17


Pyridoxine 25 Mg Tab25 Mg PO Q8HR PRN (NAUSEA OR VOMITING) #30 TAB  Ref 11


   Prov:Yvrose Andrea MD R1         5/3/17


Reported Medications


Pediatric Multiple Vitamin W/ (Gummi Bear Multivitamin/M)1 Chw Chw 


   17





Review of Systems


General / Constitutional:  No: Fever, Chills


Eyes:  No: Blurred Vision


HENT:  No: Headaches


Cardiovascular:  No: Chest Pain or Discomfort


Respiratory:  No: Short of Breath


Gastrointestinal:  Vomiting (patient reports frequent vomiting during recent 

weeks; not new symptom), Abdominal Pain (mild upper abdomen, currently resolved)

,  No: Nausea


Genitourinary:  No: Urgency, Dysuria


Skin:  No Rash


Neurologic:  No: Syncope


Psychiatric:  No: Anxiety, Depression





Physical Exam


HR 99


/73


RR 18


Narrative


GENERAL: Well-nourished, well-developed patient.


SKIN: Warm and dry.


HEAD: Normocephalic and atraumatic.


EYES: No scleral icterus. No injection or drainage. 


ENT: No nasal drainage noted. Mucous membranes pink. Airway patent.


NECK: Supple, trachea midline. No JVD. No thyromegaly or lymphadenopathy


CARDIOVASCULAR: Regular rate and rhythm without murmurs, gallops, or rubs. 


RESPIRATORY: Breath sounds equal bilaterally. No accessory muscle use


ABDOMEN/GI: Abdomen soft, non-tender, bowel sounds present, no rebound, no 

guarding 


   Gravid


EXTREMITIES: No cyanosis or edema.


BACK: Nontender without obvious deformity. No CVA tenderness.


NEUROLOGICAL: Awake and alert. Motor and sensory function grossly within normal 

limits.  


GENITOURINARY: 


   External Genitalia: intact and normal in appearance


   Cervix:


   Dilatation: 0         


   Effacement: 30%         


   Station: -2  


   Presentation: V        


   Membranes: Intact


   Uterine Contractions: Occ/irritability


FHT's: 


   Category: 1  


   Baseline: 130 


   Reactive: Y 


   Variability: Mod  


   Decels: None





Data


Data


Vital Signs Reviewed:  Yes


Orders





 Vital Signs (Adult) .ON ADMISSION (17 23:47)


^ Labor Status (17 23:47)





[facility]


Medical Record Reviewed:  Yes


Narrative Course / MDM


 27-year-old female  patient of Dr. Yvrose Andrea at 37 4/7 weeks (JESUS MANUEL 

of 17 based on LMP and ultrasound) who presents with symptoms of vaginal 

pain


-Cat 1 rhythm


-Cervix closed


-No contractions on EFM


-Physical exam and vitals benign





Plan:


-Patient monitored on CTG; reassuring fetal HR


-UA recommended to patient; patient stated that she does not need to urinate at 

this time





Updated assessment/plan:


-Due to patient's reassuring exam and fetal status, patient's vaginal pain is 

deemed likely secondary to uterine ligament stretching/fetal engagement in 

pelvis. Patient was counselled to rest at home in bed, hydrate abundantly, and 

take Tylenol as needed for pain


-Patient recommended to follow-up with OB provider in 2-3 days [discussed with 

patient that we will make follow-up appointment with Dr. Andrea, myself, or 

another OB provider to reassess her symptoms in several days].


Diagnosis


Diagnosis:  


 Primary Impression:  


 Vaginal pain


 Additional Impression:  


 Patient currently pregnant


Disposition:  01 DISCHARGE HOME


Condition:  Stable


Referrals:  


Yvrose Andrea MD R1


3 days


Patient Instructions:  Fetal Movement  (ED), General Instructions, Early Labor 

Signs (ED)


Departure Forms:  Tests/Procedures, Work Release   Enter return to work date:  

2017








Satnam Valdez MD R2 2017 23:47

## 2017-08-01 ENCOUNTER — HOSPITAL ENCOUNTER (EMERGENCY)
Dept: HOSPITAL 17 - HOBED | Age: 27
Discharge: HOME | End: 2017-08-01
Payer: MEDICAID

## 2017-08-01 DIAGNOSIS — B37.3: ICD-10-CM

## 2017-08-01 DIAGNOSIS — Z3A.38: ICD-10-CM

## 2017-08-01 DIAGNOSIS — O98.813: Primary | ICD-10-CM

## 2017-08-01 DIAGNOSIS — O34.219: ICD-10-CM

## 2017-08-01 PROCEDURE — 84112 EVAL AMNIOTIC FLUID PROTEIN: CPT

## 2017-08-01 PROCEDURE — 59025 FETAL NON-STRESS TEST: CPT

## 2017-08-01 NOTE — PD
HPI


Chief Complaint


Vaginal discharge with vaginal pain


Date Seen:  Aug 1, 2017


Time Seen:  13:07


Travel History


International Travel<30 Days:  No


Contact w/Intl Traveler<30Days:  No


Known Affected Area:  No





History of Present Illness


HPI


27-year-old  who is at 38 weeks 4 days comes in today complaining of some 

vaginal discharge that was watery in appearance as well as vaginal pain.  The 

pain started yesterday mostly located on the external vaginal area does not 

involve the rectum.  States that the vulva appears somewhat swollen, so 

moderate discharge that began this morning.  Patient had pain whenever she was 

attempted to be checked in the office today.


Para:  2


:  4


Miscarriage:  1





History


Past Medical History


Medical History:  Denies Significant Hx





Obstetric History


Obstetric History


 2





Past Surgical History


*** Narrative Surgical





Tear duct surgery





Family History


Family History:  Negative





Social History


Alcohol Use:  No


Tobacco Use:  No


Substance Abuse:  No





Allergies-Medications


(Allergen,Severity, Reaction):  


Coded Allergies:  


     No Known Allergies (Unverified , 17)


Home Meds


Active Scripts


Miconazole 3 Vaginal Cream (Monistat 3 Vaginal Cream)4 % Cream1 Appl VAGINAL HS

  #1 BOX  Ref 0


   Apply 1 applicator to the vaginal area at bedtime. Repeat for 3 days.


   Prov:Yvrose Andrea MD R1         17


Pyridoxine 25 Mg Tab25 Mg PO Q8HR PRN (NAUSEA OR VOMITING) #30 TAB  Ref 11


   Prov:Yvrose Andrea MD R1         5/3/17


Reported Medications


Pediatric Multiple Vitamin W/ (Gummi Bear Multivitamin/M)1 Chw Chw 


   17





Review of Systems


Except as stated in HPI:  all other systems reviewed are Neg





Physical Exam


Narrative


GENERAL: Well-nourished, well-developed patient.


SKIN: Warm and dry.


HEAD: Normocephalic and atraumatic.


EYES: No scleral icterus. No injection or drainage. 


CARDIOVASCULAR: Regular rate and rhythm without murmurs, gallops, or rubs. 


RESPIRATORY: Breath sounds equal bilaterally. No accessory muscle use.


ABDOMEN/GI: Abdomen soft, non-tender, bowel sounds present, no rebound, no 

guarding 


   Gravid to [-38] weeks size


   Fundal Height: [-]


GENITOURINARY: External genitalia and no evidence erythema with slight swelling 

with the discharge associated with yeast.  Amnisure is negative cervix was not 

checked


   External Genitalia: intact and normal in appearance


   BUS glands: [-]


   Cervix: [-]


   Dilatation: [-]          


   Effacement: [-]          


   Station: [-]  


   Presentation: [-]        


   Membranes: [intact or ruptured]


   Uterine Contractions: [-]


FHT's: 


   Category: [1-]   


   Baseline: [-145]   


   Reactive: [-Moderate]   


   Variability: [-Moderate]  


   Decels: [-Absent]  


EXTREMITIES: No cyanosis or edema.


BACK: Nontender without obvious deformity. No CVA tenderness.


NEUROLOGICAL: Awake and alert. Motor and sensory grossly within normal limits. 

Five out of 5 muscle strength in all muscle groups. Normal speech.





Data


Data


Vital Signs Reviewed:  Yes





MDM


Plan


27-year-old female  at 30 in weeks gestation presents with vaginal 

candidiasis.  Will treat with Terazol 3 day cream


2 prior  sections patient has a day for repeat  with tubal 

ligation


Diagnosis


Diagnosis:  


 Primary Impression:  


 38 weeks gestation of pregnancy


 Additional Impressions:  


 Vaginal candidiasis


 Previous  section complicating pregnancy, antepartum condition or 

complication


Disposition:  01 DISCHARGE HOME


Scripts


Terconazole Vaginal Cream 0.4 % Cream1 Appl VAGINAL HS  #45 GM  Ref 0


   For seven days


   Prov:Kayla Rae MD         17








Kayla Rae MD Aug 1, 2017 13:14

## 2017-08-04 ENCOUNTER — HOSPITAL ENCOUNTER (INPATIENT)
Dept: HOSPITAL 17 - H2EB | Age: 27
LOS: 3 days | Discharge: HOME | End: 2017-08-07
Attending: OBSTETRICS & GYNECOLOGY | Admitting: OBSTETRICS & GYNECOLOGY
Payer: MEDICAID

## 2017-08-04 VITALS — WEIGHT: 236 LBS | HEIGHT: 59 IN | BODY MASS INDEX: 47.58 KG/M2

## 2017-08-04 VITALS
SYSTOLIC BLOOD PRESSURE: 116 MMHG | DIASTOLIC BLOOD PRESSURE: 66 MMHG | RESPIRATION RATE: 18 BRPM | OXYGEN SATURATION: 100 % | HEART RATE: 73 BPM

## 2017-08-04 VITALS
HEART RATE: 72 BPM | SYSTOLIC BLOOD PRESSURE: 114 MMHG | DIASTOLIC BLOOD PRESSURE: 58 MMHG | OXYGEN SATURATION: 100 % | RESPIRATION RATE: 18 BRPM

## 2017-08-04 VITALS
HEART RATE: 75 BPM | SYSTOLIC BLOOD PRESSURE: 102 MMHG | TEMPERATURE: 98.6 F | RESPIRATION RATE: 16 BRPM | DIASTOLIC BLOOD PRESSURE: 64 MMHG

## 2017-08-04 VITALS
SYSTOLIC BLOOD PRESSURE: 115 MMHG | OXYGEN SATURATION: 100 % | DIASTOLIC BLOOD PRESSURE: 67 MMHG | HEART RATE: 72 BPM | TEMPERATURE: 97.9 F | RESPIRATION RATE: 18 BRPM

## 2017-08-04 VITALS
HEART RATE: 98 BPM | DIASTOLIC BLOOD PRESSURE: 55 MMHG | SYSTOLIC BLOOD PRESSURE: 108 MMHG | RESPIRATION RATE: 18 BRPM | OXYGEN SATURATION: 99 % | TEMPERATURE: 98.2 F

## 2017-08-04 VITALS — HEART RATE: 82 BPM

## 2017-08-04 VITALS — HEART RATE: 81 BPM

## 2017-08-04 VITALS — HEART RATE: 83 BPM

## 2017-08-04 VITALS — HEART RATE: 87 BPM

## 2017-08-04 DIAGNOSIS — R11.0: ICD-10-CM

## 2017-08-04 DIAGNOSIS — N83.8: ICD-10-CM

## 2017-08-04 DIAGNOSIS — K66.0: ICD-10-CM

## 2017-08-04 DIAGNOSIS — Z3A.39: ICD-10-CM

## 2017-08-04 DIAGNOSIS — O34.83: ICD-10-CM

## 2017-08-04 DIAGNOSIS — O34.211: Primary | ICD-10-CM

## 2017-08-04 LAB
BASOPHILS # BLD AUTO: 0 TH/MM3 (ref 0–0.2)
BASOPHILS NFR BLD: 0.2 % (ref 0–2)
COLOR UR: YELLOW
COMMENT (UR): (no result)
CULTURE IF INDICATED: (no result)
EOSINOPHIL # BLD: 0.1 TH/MM3 (ref 0–0.4)
EOSINOPHIL NFR BLD: 1 % (ref 0–4)
ERYTHROCYTE [DISTWIDTH] IN BLOOD BY AUTOMATED COUNT: 17.4 % (ref 11.6–17.2)
GLUCOSE UR STRIP-MCNC: (no result) MG/DL
HCT VFR BLD CALC: 31.7 % (ref 35–46)
HEMO FLAGS: (no result)
HGB UR QL STRIP: (no result)
KETONES UR STRIP-MCNC: (no result) MG/DL
LYMPHOCYTES # BLD AUTO: 1.8 TH/MM3 (ref 1–4.8)
LYMPHOCYTES NFR BLD AUTO: 24.3 % (ref 9–44)
MCH RBC QN AUTO: 25.4 PG (ref 27–34)
MCHC RBC AUTO-ENTMCNC: 31.5 % (ref 32–36)
MCV RBC AUTO: 80.4 FL (ref 80–100)
MONOCYTES NFR BLD: 5.3 % (ref 0–8)
MUCOUS THREADS #/AREA URNS LPF: (no result) /LPF
NEUTROPHILS # BLD AUTO: 5.1 TH/MM3 (ref 1.8–7.7)
NEUTROPHILS NFR BLD AUTO: 69.2 % (ref 16–70)
NITRITE UR QL STRIP: (no result)
PLATELET # BLD: 384 TH/MM3 (ref 150–450)
RBC # BLD AUTO: 3.95 MIL/MM3 (ref 4–5.3)
SP GR UR STRIP: 1.02 (ref 1–1.03)
SQUAMOUS #/AREA URNS HPF: 3 /HPF (ref 0–5)
WBC # BLD AUTO: 7.3 TH/MM3 (ref 4–11)

## 2017-08-04 PROCEDURE — 86922 COMPATIBILITY TEST ANTIGLOB: CPT

## 2017-08-04 PROCEDURE — 0KNL0ZZ RELEASE LEFT ABDOMEN MUSCLE, OPEN APPROACH: ICD-10-PCS | Performed by: OBSTETRICS & GYNECOLOGY

## 2017-08-04 PROCEDURE — 86901 BLOOD TYPING SEROLOGIC RH(D): CPT

## 2017-08-04 PROCEDURE — 0DNW0ZZ RELEASE PERITONEUM, OPEN APPROACH: ICD-10-PCS | Performed by: OBSTETRICS & GYNECOLOGY

## 2017-08-04 PROCEDURE — 86920 COMPATIBILITY TEST SPIN: CPT

## 2017-08-04 PROCEDURE — 90715 TDAP VACCINE 7 YRS/> IM: CPT

## 2017-08-04 PROCEDURE — 81001 URINALYSIS AUTO W/SCOPE: CPT

## 2017-08-04 PROCEDURE — 86850 RBC ANTIBODY SCREEN: CPT

## 2017-08-04 PROCEDURE — 86900 BLOOD TYPING SEROLOGIC ABO: CPT

## 2017-08-04 PROCEDURE — 76818 FETAL BIOPHYS PROFILE W/NST: CPT

## 2017-08-04 PROCEDURE — 0KNK0ZZ RELEASE RIGHT ABDOMEN MUSCLE, OPEN APPROACH: ICD-10-PCS | Performed by: OBSTETRICS & GYNECOLOGY

## 2017-08-04 PROCEDURE — 86870 RBC ANTIBODY IDENTIFICATION: CPT

## 2017-08-04 PROCEDURE — 85025 COMPLETE CBC W/AUTO DIFF WBC: CPT

## 2017-08-04 PROCEDURE — 86077 PHYS BLOOD BANK SERV XMATCH: CPT

## 2017-08-04 RX ADMIN — ONDANSETRON PRN MG: 2 INJECTION, SOLUTION INTRAMUSCULAR; INTRAVENOUS at 18:44

## 2017-08-04 NOTE — HHI.HP
HPI


Chief Complaint


Repeat  section


Date Seen:  Aug 4, 2017


Time Seen:  10:27


Travel History


International Travel<30 Days:  No


Contact w/Intl Traveler<30Days:  No


Known Affected Area:  No





History of Present Illness


HPI


Patient is a 27-year-old  at 39 weeks who presents today for a repeat 

 section. She has received routine prenatal care with Dr. Yvrose Andrea 

at the [facility].





Patient denies regular contractions, vaginal bleeding and fluid leakage. 

Positive fetal movement.


Para:  2


:  3





History


Past Medical History


*** Narrative Medical


History of chlamydia, treated





Obstetric History


Obstetric History


: 2012, 41 weeks, female,  @ [facility], emergency  

secondary to nonreassuring fetal heart tones


G2: 14, 40 weeks, female, repeat  @ [facility]





Past Surgical History


*** Narrative Surgical


 section 2


Tear duct surgery


Surgery on the right hand as child





Family History


Family History:  Negative





Social History


Alcohol Use:  No


Tobacco Use:  No


Substance Abuse:  No





Allergies-Medications


(Allergen,Severity, Reaction):  


Coded Allergies:  


     No Known Allergies (Unverified , 17)


Home Meds


Active Scripts


Terconazole Vaginal Cream 0.4 % Cream1 Appl VAGINAL HS  #45 GM  Ref 0


   For seven days


   Prov:Kayla Rae MD         17


Miconazole 3 Vaginal Cream (Monistat 3 Vaginal Cream)4 % Cream1 Appl VAGINAL HS

  #1 BOX  Ref 0


   Apply 1 applicator to the vaginal area at bedtime. Repeat for 3 days.


   Prov:Yvrose Andrea MD R2         17


Pyridoxine 25 Mg Tab25 Mg PO Q8HR PRN (NAUSEA OR VOMITING) #30 TAB  Ref 11


   Prov:Yvrose Andrea MD R2         5/3/17


Reported Medications


Pediatric Multiple Vitamin W/ (Gummi Bear Multivitamin/M)1 Chw Chw 


   17





Review of Systems


General / Constitutional:  No: Fever, Weight Gain, Chills, Other


Eyes:  No: Diploplia, Blurred Vision, Visual changes, Pain, Photophobia


HENT:  No: Headaches, Vertigo, Lightheadedness


Cardiovascular:  Edema (lower extremity),  No: Irregular Rhythm, Chest Pain or 

Discomfort, Palpitations, Tachycardia, Syncope, Varicosities, Cyanosis


Respiratory:  No: Cough, Short of Breath, Other


Gastrointestinal:  No: Nausea, Vomiting, Diarrhea


Genitourinary:  No: Decreased Urinary Output, Oliguria


Musculoskeletal:  Edema (lower extremity),  No: Limited ROM, Weakness, Cramping

, Pain


Skin:  No Rash, No Itching, No Dryness, No Lumps, No Change in Pigmentation, No 

Change in Nails, No Alopecia, No Lesions


Neurologic:  No: Weakness, Dizziness, Syncope, Focal Abnormalities, 

Coordination Problem, Headache, Slurred Speech, Seizures


Psychiatric:  No: Depression, Suicidal Ideations, Homicidal Ideation


Endocrine:  No: Heat Intolerance, Cold Intolerance, Polydipsia, Polyuria, Other





Physical Exam


/72, HR 92, RR 18. Temperature 98.7


Narrative


GENERAL: Well-nourished, well-developed patient.


SKIN: Warm and dry.


HEAD: Normocephalic and atraumatic.


EYES: No scleral icterus. No injection or drainage. 


ENT: No nasal drainage noted. Mucous membranes pink. Airway patent.


NECK: Supple, trachea midline. No JVD.


CARDIOVASCULAR: Regular rate and rhythm without murmurs, gallops, or rubs. 


RESPIRATORY: Breath sounds equal bilaterally. No accessory muscle use.


ABDOMEN/GI: Abdomen soft, non-tender. 


   Gravid to 39 weeks size


GENITOURINARY: 


   Membranes: Intact


   Uterine Contractions: None 


FHT's: 


   Category: 1   


   Baseline: 140   


   Reactive: +   


   Variability: Moderate  


   Decels: None   


EXTREMITIES: No cyanosis or edema.


BACK: Nontender without obvious deformity. No CVA tenderness.


NEUROLOGICAL: Awake and alert. Motor and sensory grossly within normal limits. 

Five out of 5 muscle strength in all muscle groups. Normal speech.





Data


Data


Vital Signs Reviewed:  Yes





Assessment/Plan


Assessment and Plan


Patient is a 27-year-old  at 39 weeks who presents today for a repeat 

 section.


* Admit to inpatient.


* Diet NPO.


* CBC - hemoglobin 10.0. 


* UA - pending.


* Type and screen - pending.


* LR IV.


* SCDs ordered.


* Urinary catheter placed.


* Ancef 2 g IV.








Monica Fortune MD R1 Aug 4, 2017 10:28

## 2017-08-04 NOTE — PD.LABORPN
Subjective


Subjective


[facility] Attending Note


Patient is a 28y/o  with IUP at 39.0. She is here for a repeat C/S. She 

initially indicated she was interested in a BTL but her papers were only signed 

on  and are not yet mature.  However, after a thorough discussion of the 

permenant and irreversible nature of the procedure including the inability to 

have biological children without invitrofertilization,she states she no longer 

desires the tubal. She states that if something were to happen to one of her 

children (like a death), she would want to try to have additional children. The 

risks of C/S were discussed at length including but not limited to pain, 

infection, bleeding, injury to other organs (bladder, bowels, nerves, vessels) 

or baby, repeat operation for complication from this surgery, the increased 

risks with each sequential surgery, wound separation/breakdown, transfusion, 

and possible hysterectomy, as well as other possible risks and complications. 

All of the patients questions were answered and consent was signed. Discussed 

alternatives to BTL and patient will explore the long-term reversible 

contraceptive options.





Objective


Vital Signs





 Vital Signs








  Date Time  Temp Pulse Resp B/P Pulse Ox O2 Delivery O2 Flow Rate FiO2


 


17 11:35  83      


 


17 11:30  87      


 


17 11:25  82      


 


17 10:55  82      








Objective


Pelvic Exam:   


   Cervix: [-]


   Dilatation: [-]          


   Effacement: [-]          


   Station: [-]  


   Presentation: [-]        


   Membranes: [intact or ruptured]


   Uterine Contractions: [-]


FHT's: 


   Category: [-]   


   Baseline: [-]   


   Reactive: [-]   


   Variability: [-]  


   Decels: [-]








Tammi Yuen MD Aug 4, 2017 12:05

## 2017-08-05 LAB
BASOPHILS # BLD AUTO: 0 TH/MM3 (ref 0–0.2)
BASOPHILS NFR BLD: 0.4 % (ref 0–2)
EOSINOPHIL # BLD: 0.1 TH/MM3 (ref 0–0.4)
EOSINOPHIL NFR BLD: 1 % (ref 0–4)
ERYTHROCYTE [DISTWIDTH] IN BLOOD BY AUTOMATED COUNT: 17.4 % (ref 11.6–17.2)
HCT VFR BLD CALC: 27.7 % (ref 35–46)
HEMO FLAGS: (no result)
LYMPHOCYTES # BLD AUTO: 1.5 TH/MM3 (ref 1–4.8)
LYMPHOCYTES NFR BLD AUTO: 15.2 % (ref 9–44)
MCH RBC QN AUTO: 26.3 PG (ref 27–34)
MCHC RBC AUTO-ENTMCNC: 32.9 % (ref 32–36)
MCV RBC AUTO: 79.8 FL (ref 80–100)
MONOCYTES NFR BLD: 6.9 % (ref 0–8)
NEUTROPHILS # BLD AUTO: 7.8 TH/MM3 (ref 1.8–7.7)
NEUTROPHILS NFR BLD AUTO: 76.5 % (ref 16–70)
PLATELET # BLD: 309 TH/MM3 (ref 150–450)
RBC # BLD AUTO: 3.47 MIL/MM3 (ref 4–5.3)
WBC # BLD AUTO: 10.1 TH/MM3 (ref 4–11)

## 2017-08-05 RX ADMIN — ONDANSETRON PRN MG: 2 INJECTION, SOLUTION INTRAMUSCULAR; INTRAVENOUS at 08:06

## 2017-08-05 RX ADMIN — IBUPROFEN PRN MG: 600 TABLET, FILM COATED ORAL at 20:09

## 2017-08-05 RX ADMIN — OXYCODONE HYDROCHLORIDE AND ACETAMINOPHEN PRN TAB: 5; 325 TABLET ORAL at 12:17

## 2017-08-05 RX ADMIN — OXYCODONE HYDROCHLORIDE AND ACETAMINOPHEN PRN TAB: 5; 325 TABLET ORAL at 01:24

## 2017-08-05 RX ADMIN — OXYCODONE HYDROCHLORIDE AND ACETAMINOPHEN PRN TAB: 5; 325 TABLET ORAL at 20:09

## 2017-08-05 RX ADMIN — IBUPROFEN PRN MG: 600 TABLET, FILM COATED ORAL at 12:17

## 2017-08-05 RX ADMIN — OXYCODONE HYDROCHLORIDE AND ACETAMINOPHEN PRN TAB: 5; 325 TABLET ORAL at 07:05

## 2017-08-05 NOTE — MP
cc:

TAMMI YUEN MD

****

 

 

DATE OF SURGERY:

2017

 

PREOPERATIVE DIAGNOSIS:

1.  Pregnancy at 39 weeks.

2.  Prior  section times two.

3.  Obesity.

 

POSTOPERATIVE DIAGNOSIS:

1.  Pregnancy at 39 weeks.

2.  Prior  section times two.

3.  Obesity.

 

SURGEON:

Tammi Yuen MD.

 

ASSISTANT:

HONG Cuadra.

    <<0:31>>

 

PROCEDURE:

Repeat low transverse  section, two layer closure, no extensions.

<<0:36>>     skin incision.

 

INDICATIONS FOR PROCEDURE:

The patient is a  27 year-old G3, P2-0-0-2, presented for a term repeat

 delivery.

 

FINDINGS:

A viable male infant in a cephalic presentation, Apgar's 9 and 9, weighing 2990

grams. The baby had normal maternal anatomy with some dense adhesions of the

rectus muscle. In addition the Omentum was adherent to he fascia and anterior

abdominal wall and the bladder was adhere to the anterior abdominal wall. There

was a 1 cm right paratubal cyst noted.

 

SPECIMENS:

Specimens submitted not.

 

ESTIMATED BLOOD LOSS:

600 cc.

 

URINE OUTPUT:

100 cc.  Clear urine at the end of the procedure.

 

INTRAVENOUS FLUIDS

2300 cc. Lactate ringers.

 

COMPLICATIONS:

None.

 

PROCEDURE:

After obtaining informed consent, risks, benefits and alternatives discussed at

length, including but not limited to pain, infection, bleeding, injury to other

organs, bladder, bowels, nerves and vessels, injury to the baby, need for

repeat operation or hysterectomy. Need for blood transfusion, wound break down

and infection and other risks.

 

The patient was taken to the operating room with intravenous fluids running.

Reassuring heart tones were reconfirmed in the operating room and the patient

underwent spinal anesthesia without difficulty. She is placed in the dorsal

spine position with a leftward tilt and reassuring fetal heart tones confirmed.

 

The Cooln catheter was placed under sterile conditions and the patient was

prepped and draped in normal sterile fashion. A time out procedure was

preformed.

 

After once again confirming adequate anesthesia, Pfannenstiel skin incisions

made with the scalpel and carried down to the level of the fascia. The fascia

was nicked in the midline with the scalpel and a fascial incision was extended

laterally with the peritoneal scissors. The Kocher clamps were applied to the

superior aspect of the fascial incision was placed divided the rectus muscles

bluntly under sharp dissection.

 

The Kocher clamps were applied to the inferior aspect of the fascial incisions,

was dissected out in a similar fashion. All the rectus muscles were noted to be

densely adherent. However were able to be  just anterior to the

dissected off superior fascia. The peritoneum was entered bluntly and extended

bluntly using care to maintain visualization of the bladder.

 

All the rectus muscles were  sharply with the curved Daniels scissors by

underlying with the surgeons finger. The peritoneum incision was then again

extended and the adhesive band was noted on the patients left and transected.

The     <<3:43>>     wound retractor was placed. The lower uterine segment was

visualized. The bladder was noted to be well free of the area in which it was

decided to make the hysterotomy. The hysterotomy was created by thinning out

the lower uterine segment with the scalpel and subsequently entered and created

bluntly and hysterotomy was extended bluntly, clear fluid was noted.

 

The fetal vertex was elevated at the level of the hysterotomy, delivered

atraumatically. The remainder of the infant delivered atraumatically, the nose

and mouth were suctioned with the bulb suction. After delay as per protocol.

The cord was doubly clamped and cut and the infant was then handed off the

awaiting pediatric team, cord blood was obtained from the nurse and the

placenta was removed manually. The uterus was cleared of all clots and debris,

the hysterotomy was repaired with the #1 chromic in a running locking fashion.

The second layer of the tissue was used in an imbricating fashion.

 

An additional figure of eight suture placed, in the mid portion of the

hysterotomy after we checked hemostasis was noted. The fallopian tubes and

ovaries were remove and examined and noted to be normal with the exception of

the 1 cm right paratubal cyst. Hysterectomy was reinspected and noted to be

hemostatic.

 

The bladder was noted to be well and free and clear from the hysterotomy. An

omental band was dissected of the fascia by clamping, cutting and tied.  The

rectus muscles were reapproximated in the midline. The rectus muscles were

examined and noted to be hemostatic.  The rectus muscles were reapproximated

with #1 Chromic.

 

The fascia is reapproximated with #1 Vicryl in a running fascia, both ABC'S

were clearly included in the fascial closure and noted closure of the defect.

Subcutaneous tissue was irrigated with normal saline and noted to be hemostatic

after application of Bovie cautery. Subcutaneous tissue was reapproximated with

2-0 Vicryl. The skin edges are reapproximated with 4-0 Monocryl, excellent

hemostasis and cosmesis were noted. Dermabond and a sterile dressing were

applied. The instruments, needle counts were correct times four.  I preformed

the entire procedure myself.



 

 

                              _________________________________

                              MD SONI Augustin/marialuisa

D:  2017/11:21 PM

T:  2017/6:35 AM

Visit #:  [id number]

Job #:  [id number]

[email or web address]

## 2017-08-05 NOTE — HHI.OB
Subjective


Post Operative Day:  1


Remarks


Postoperative day #1. Afebrile, vital signs stable overnight. Incision is 

covered with pressure dressing and not draining. She reports minimal vaginal 

bleeding. She had to be straight catheterized yesterday but has since voided 

spontaneously. No breast tenderness. She is feeding the baby via formula but 

plans to transition to breast-feeding today. Appetite is okay but does have 

nausea this morning and has only been on liquids. No vomiting. Has not yet had 

a bowel movement or passing flatus. Ambulating well without dizziness or 

shortness of breath.





Objective


Vitals/I&O





Vital Signs








  Date Time  Temp Pulse Resp B/P Pulse Ox O2 Delivery O2 Flow Rate FiO2


 


17 14:50 98.6 75 16 102/64    


 


17 14:15  72 18 115/67 100   


 


17 14:15 97.9       


 


17 14:00  73 18  100   


 


17 14:00    116/66    


 


17 13:45    114/58    


 


17 13:45  72 18  100   


 


17 13:32  98  108/55    


 


17 13:32 98.2  18  99   


 


17 11:40  81      


 


17 11:35  83      


 


17 11:30  87      


 


17 11:25  82      


 


17 10:55  82      








Result Diagram:  


17 0653





Objective Remarks


GENERAL: Well-nourished, well-developed female patient. Obese.


CARDIOVASCULAR: Regular rate and rhythm without murmurs, gallops, or rubs. 


RESPIRATORY: Breath sounds equal bilaterally. No accessory muscle use.


ABDOMEN/GI: Abdomen soft, obese, non-tender, bowel sounds diminished.


   Incision: Clean, dry and intact pressure dressing


   Fundus: Firm, non-tender at umbilicus.


GENITOURINARY: Light to moderate bleeding.


EXTREMITIES: No cyanosis or edema, non-tender, without signs of DVT.


Medications and IVs





 Current Medications








 Medications


  (Trade)  Dose


 Ordered  Sig/Quang


 Route  Start Time


 Stop Time Status Last Admin


 


  (Lr 1000 ml Inj)  1,000 ml @ 


 100 mls/hr  Q10H


 IV  17 18:22


 17 14:21  17 19:43


 


 


  (NS Flush)  2 ml  BID


 IV FLUSH  17 21:00


     


 


 


  (NS Flush)  2 ml  UNSCH  PRN


 IV FLUSH  17 13:30


     


 


 


  (Mylicon Chew)  80 mg  QID  PRN


 PO  17 13:30


     


 


 


  (Tylenol)  650 mg  Q6H  PRN


 PO  17 13:30


     


 


 


  (Motrin)  600 mg  Q6H  PRN


 PO  17 13:30


     


 


 


  (Percocet  5-325


 Mg)  1 tab  Q4H  PRN


 PO  17 13:30


     


 


 


  (Percocet  5-325


 Mg)  2 tab  Q4H  PRN


 PO  17 13:30


    17 07:05


 


 


  (Tami-Colace)  2 tab  Q12H  PRN


 PO  17 13:30


     


 


 


  (Ambien)  5 mg  HS  PRN


 PO  17 13:30


     


 


 


  (M-M-R Ii Inj)  0.5 ml  ONCE ONCE


 SQ  17 16:00


 17 16:01   


 


 


  (Boostrix Inj)  0.5 ml  ONCE ONCE


 IM  17 16:00


 17 16:01   


 


 


  (Zofran Inj)  4 mg  Q6H  PRN


 IV PUSH  17 13:30


    17 08:06


 











Assessment/Plan


Assessment and Plan


26 y/o female who is POD #1 status post repeat . 


-Postoperative H&H stable


-Continue routine postpartum care.


-Percocet and Motrin PRN pain.


-Encouraged OOB. Advised pelvic rest for 6 wks. Will need a f/u appt. in 1 wk 

for incision check.


-Re: birth ctrl, she would like to consider IUD versus Nexplanon.


-Anticipate discharge in 12 days.   





Antibody screen positive, leukocyte reduced red blood cells were prepared by 

the blood bank, but not indicated at this time.





Discussed with Dr. Cassia Yuen


Discharge Planning


Likely 12 days








Yvrose Andrea MD R2 Aug 5, 2017 08:28

## 2017-08-06 RX ADMIN — IBUPROFEN PRN MG: 600 TABLET, FILM COATED ORAL at 11:03

## 2017-08-06 RX ADMIN — IBUPROFEN PRN MG: 600 TABLET, FILM COATED ORAL at 17:31

## 2017-08-06 RX ADMIN — OXYCODONE HYDROCHLORIDE AND ACETAMINOPHEN PRN TAB: 5; 325 TABLET ORAL at 02:26

## 2017-08-06 RX ADMIN — IBUPROFEN PRN MG: 600 TABLET, FILM COATED ORAL at 23:51

## 2017-08-06 RX ADMIN — IBUPROFEN PRN MG: 600 TABLET, FILM COATED ORAL at 02:26

## 2017-08-06 RX ADMIN — OXYCODONE HYDROCHLORIDE AND ACETAMINOPHEN PRN TAB: 5; 325 TABLET ORAL at 11:04

## 2017-08-06 RX ADMIN — OXYCODONE HYDROCHLORIDE AND ACETAMINOPHEN PRN TAB: 5; 325 TABLET ORAL at 17:31

## 2017-08-06 RX ADMIN — OXYCODONE HYDROCHLORIDE AND ACETAMINOPHEN PRN TAB: 5; 325 TABLET ORAL at 23:52

## 2017-08-06 NOTE — HHI.OB
Subjective


Post Operative Day:  2


Remarks


Pt seen and examined this morning. Postoperative  day # 2 AFVSS overnight. 

Incision not draining. Decreased lochia. Denies dysuria. No breast tenderness. 

She is feeding the baby via breast and bottle. Appetite good. No nausea or 

vomiting. Patient has not yet had a bowel movement, but does endorse passing 

gas.  Ambulating well. Denies calf pain or shortness of breath. Otherwise, she 

is doing well this morning and has no other concerns.





Objective


Result Diagram:  


17 0653





Objective Remarks


GENERAL: Well-nourished, well-developed female patient. Obese.


CARDIOVASCULAR: Regular rate and rhythm without murmurs, gallops, or rubs. 


RESPIRATORY: Breath sounds equal bilaterally. No accessory muscle use.


ABDOMEN/GI: Abdomen soft, obese, non-tender, bowel sounds diminished.


   Incision: Clean, dry and intact pressure dressing.


   Fundus: Firm, non-tender at umbilicus.


GENITOURINARY: Light to moderate bleeding.


EXTREMITIES: No cyanosis or edema, non-tender, without signs of DVT.


Medications and IVs





 Current Medications








 Medications


  (Trade)  Dose


 Ordered  Sig/Quang


 Route  Start Time


 Stop Time Status Last Admin


 


  (NS Flush)  2 ml  BID


 IV FLUSH  17 21:00


     


 


 


  (NS Flush)  2 ml  UNSCH  PRN


 IV FLUSH  17 13:30


     


 


 


  (Mylicon Chew)  80 mg  QID  PRN


 PO  17 13:30


     


 


 


  (Tylenol)  650 mg  Q6H  PRN


 PO  17 13:30


     


 


 


  (Motrin)  600 mg  Q6H  PRN


 PO  17 13:30


    17 02:26


 


 


  (Percocet  5-325


 Mg)  1 tab  Q4H  PRN


 PO  17 13:30


     


 


 


  (Percocet  5-325


 Mg)  2 tab  Q4H  PRN


 PO  17 13:30


    17 02:26


 


 


  (Tami-Colace)  2 tab  Q12H  PRN


 PO  17 13:30


     


 


 


  (Ambien)  5 mg  HS  PRN


 PO  17 13:30


     


 


 


  (Zofran Inj)  4 mg  Q6H  PRN


 IV PUSH  17 13:30


    17 08:06


 


 


  (Phenergan Inj)  25 mg  Q6H  PRN


 IM  17 16:00


    17 16:57


 











Assessment/Plan


Assessment and Plan


28 y/o female who is POD #1 status post repeat . 


-Postoperative H&H stable


-Continue routine postpartum care.


-Percocet and Motrin PRN pain.


-Patient to remove surgical bandage today.


-Encouraged OOB. Advised pelvic rest for 6 wks. Will need a f/u appt. in 1 wk 

for incision check.


-Re: birth ctrl, she would like to consider IUD versus Nexplanon.


-Anticipate discharge tomorrow pending clinical course.





Antibody screen positive, leukocyte reduced red blood cells were prepared by 

the blood bank, but not indicated at this time.





Discussed with Dr. Woods


Discharge Planning


Likely tomorrow








Demarcus Blas MD R2 Aug 6, 2017 08:34

## 2017-08-07 RX ADMIN — IBUPROFEN PRN MG: 600 TABLET, FILM COATED ORAL at 08:36

## 2017-08-07 NOTE — HHI.OB
Subjective


Post Operative Day:  3


Remarks


Patient is a 27-year-old  delivered at 39 weeks and 0 days. Patient is 

postop day 3 after . Patient's pain is well-controlled. Patient 

reports eating and drinking without any nausea or vomiting. Patient reports 

minimal bleeding. Patient has passed gas but no bowel movements. Patient is 

walking without lower extremity pain or shortness of breath or chest pain. She 

denies feelings of depression. Patient reports desire for contraception with 

OCPs and bottle feeding and breast-feeding.





Objective


Result Diagram:  


17 0653





Objective Remarks


GENERAL: Well-nourished, well-developed female patient. Obese.


CARDIOVASCULAR: Regular rate and rhythm without murmurs, gallops, or rubs. 


RESPIRATORY: Breath sounds equal bilaterally. No accessory muscle use.


ABDOMEN/GI: Abdomen soft, obese, non-tender, bowel sounds diminished.


   Incision: Clean, dry and intact pressure dressing.


   Fundus: Firm, non-tender at umbilicus.


GENITOURINARY: Light bleeding.


EXTREMITIES: No cyanosis or edema, non-tender, without signs of DVT.


Medications and IVs





 Current Medications








 Medications


  (Trade)  Dose


 Ordered  Sig/Quang


 Route  Start Time


 Stop Time Status Last Admin


 


  (NS Flush)  2 ml  BID


 IV FLUSH  17 21:00


     


 


 


  (NS Flush)  2 ml  UNSCH  PRN


 IV FLUSH  17 13:30


     


 


 


  (Mylicon Chew)  80 mg  QID  PRN


 PO  17 13:30


     


 


 


  (Tylenol)  650 mg  Q6H  PRN


 PO  17 13:30


     


 


 


  (Motrin)  600 mg  Q6H  PRN


 PO  17 13:30


    17 23:51


 


 


  (Percocet  5-325


 Mg)  1 tab  Q4H  PRN


 PO  17 13:30


     


 


 


  (Percocet  5-325


 Mg)  2 tab  Q4H  PRN


 PO  17 13:30


    17 23:52


 


 


  (Tami-Colace)  2 tab  Q12H  PRN


 PO  17 13:30


    17 11:04


 


 


  (Ambien)  5 mg  HS  PRN


 PO  17 13:30


     


 


 


  (Zofran Inj)  4 mg  Q6H  PRN


 IV PUSH  17 13:30


    17 08:06


 


 


  (Phenergan Inj)  25 mg  Q6H  PRN


 IM  17 16:00


    17 16:57


 











Assessment/Plan


Assessment and Plan


Patient is a 27-year-old  delivered at 39 weeks and 0 days. Patient is 

postop day 3 after . 


-Postoperative H&H stable


-Continue routine postpartum care.


-Percocet and Motrin PRN pain.


-Encouraged OOB. Advised pelvic rest for 6 wks and follow-up at that time. Will 

need a f/u appt. in 1 wk for incision check.


-Re: birth ctrl, will discharge with prescription for OCPs


-Anticipate discharge today pending clinical course.





Antibody screen positive, leukocyte reduced red blood cells were prepared by 

the blood bank, but not indicated at this time.





Discussed with Dr. Yuen


Discharge Planning


Likely today








Landau,Michael A MD R2 Aug 7, 2017 08:14

## 2017-08-07 NOTE — HHI.DCPOC
Discharge Care Plan


Diagnosis:  


(1) Previous  section


(2) S/P 


Report Symptoms to Your Doctor


-Temperature above 100.5 degrees


-Redness, of incision or excessive or foul smelling drainage


-Unusual pain or calf pain


-Increased vaginal bleeding


-Painful or difficulty urinating


-Feelings of extreme sadness or anxiety after 2 weeks


Goals to Promote Your Health


* To prevent worsening of your condition and complications, please follow up 

with your doctor in 1 week and again in 6 weeks. 


* To maintain your health at the optimal level, please diet and exercise as 

instructed by your doctor.


Directions to Meet Your Goals


*** Take your medications as prescribed


*** Follow your dietary instruction


*** Follow activity as directed


*** Ensure plenty of rest for recovery


*** Drink fluids for hydration








*** Keep your appointments as scheduled


*** Take your immunizations and boosters as scheduled


*** If your symptoms worsen call your PCP, if no PCP go to Urgent Care Center 

or Emergency Room***


*** Smoking is Dangerous to Your Health. Avoid second hand smoke***


***Call the 24-hour crisis hotline for domestic abuse at 1-705.383.6106***








Landau,Michael A MD R2 Aug 7, 2017 10:36

## 2017-12-03 ENCOUNTER — HOSPITAL ENCOUNTER (EMERGENCY)
Dept: HOSPITAL 17 - NEPE | Age: 27
Discharge: HOME | End: 2017-12-03
Payer: MEDICAID

## 2017-12-03 VITALS
DIASTOLIC BLOOD PRESSURE: 76 MMHG | HEART RATE: 91 BPM | OXYGEN SATURATION: 100 % | SYSTOLIC BLOOD PRESSURE: 148 MMHG | RESPIRATION RATE: 18 BRPM

## 2017-12-03 VITALS
RESPIRATION RATE: 18 BRPM | SYSTOLIC BLOOD PRESSURE: 106 MMHG | TEMPERATURE: 98 F | OXYGEN SATURATION: 99 % | HEART RATE: 117 BPM | DIASTOLIC BLOOD PRESSURE: 71 MMHG

## 2017-12-03 VITALS — HEIGHT: 59 IN | BODY MASS INDEX: 44.44 KG/M2 | WEIGHT: 220.46 LBS

## 2017-12-03 DIAGNOSIS — N39.0: Primary | ICD-10-CM

## 2017-12-03 DIAGNOSIS — R00.0: ICD-10-CM

## 2017-12-03 DIAGNOSIS — Z79.899: ICD-10-CM

## 2017-12-03 DIAGNOSIS — B96.89: ICD-10-CM

## 2017-12-03 LAB
COLOR UR: YELLOW
COMMENT (UR): (no result)
CULTURE IF INDICATED: (no result)
GLUCOSE UR STRIP-MCNC: (no result) MG/DL
HGB UR QL STRIP: (no result)
KETONES UR STRIP-MCNC: (no result) MG/DL
MUCOUS THREADS #/AREA URNS LPF: (no result) /LPF
NITRITE UR QL STRIP: (no result)
SP GR UR STRIP: 1.02 (ref 1–1.03)
SQUAMOUS #/AREA URNS HPF: 1 /HPF (ref 0–5)
TRANS CELLS #/AREA URNS HPF: <1 /HPF

## 2017-12-03 PROCEDURE — 87086 URINE CULTURE/COLONY COUNT: CPT

## 2017-12-03 PROCEDURE — 84703 CHORIONIC GONADOTROPIN ASSAY: CPT

## 2017-12-03 PROCEDURE — 81001 URINALYSIS AUTO W/SCOPE: CPT

## 2017-12-03 PROCEDURE — 99284 EMERGENCY DEPT VISIT MOD MDM: CPT

## 2017-12-03 NOTE — PD
HPI


Chief Complaint:   Complaint


Time Seen by Provider:  13:38


Travel History


International Travel<30 days:  No


Contact w/Intl Traveler<30days:  No


Traveled to known affect area:  No





History of Present Illness


HPI


Patient is a 27-year-old female presenting to emergency department for 

evaluation of urinary symptoms.  Patient states he started 3 days ago, she 

reports a cramping sensation in her pelvic area.  She denies any nausea, 

vomiting, fever, chills, back pain.  Patient states that she has to urinate 

very frequently and it burns when she urinates.  He denies any vaginal discharge

, itching, bleeding.  Her last menstrual cycle was 2 weeks ago.





PFSH


Past Medical History


Medical History:  Denies Significant Hx


Diabetes:  No


Diminished Hearing:  No


Immune Disorder:  No


Tetanus Vaccination:  < 5 Years


Pregnant?:  Not Pregnant


LMP:  2017


:  1


Para:  1


Miscarriage:  0


:  0





Past Surgical History


 Section:  Yes


Eye Surgery:  Yes (TEAR DUCT)





Social History


Alcohol Use:  No


Tobacco Use:  No


Substance Use:  No





Allergies-Medications


(Allergen,Severity, Reaction):  


Coded Allergies:  


     No Known Allergies (Unverified  Adverse Reaction, Unknown, 12/3/17)


Reported Meds & Prescriptions





Reported Meds & Active Scripts


Active


Macrobid (Nitrofurantoin Monoh/Nitrofur Macro) 100 Mg Cap 100 Mg PO BID


Pyridium (Phenazopyridine HCl) 100 Mg Tab 100 Mg PO Q8H PRN


Sprintec 28 (Norgestimate-Ethinyl Estradiol) 0.25-35 mg-Mcg Tab 1 Tab PO DAILY








Review of Systems


Except as stated in HPI:  all other systems reviewed are Neg


General / Constitutional:  No: Fever, Chills


HENT:  No: Headaches


Cardiovascular:  No: Chest Pain or Discomfort


Genitourinary:  Positive: Urgency, Frequency, Dysuria, Pelvic Pain, No: 

Discharge, Vaginal Bleeding


Musculoskeletal:  No: Myalgias, Pain





Physical Exam


Narrative


GENERAL: Overweight, well-developed, alert  female.  Resting 

comfortably in no acute distress.


SKIN: Warm and dry.


HEAD: Atraumatic. Normocephalic. 


EYES: Pupils equal and round. No scleral icterus. No injection or drainage. 


ENT: No nasal bleeding or discharge.  Mucous membranes pink and moist.


NECK: Trachea midline. No JVD. 


CARDIOVASCULAR: Regular rate and rhythm.  


RESPIRATORY: No accessory muscle use. Clear to auscultation. Breath sounds 

equal bilaterally. 


GASTROINTESTINAL: Abdomen soft, non-tender, nondistended. Hepatic and splenic 

margins not palpable.  Positive bowel sounds, no rebound, no guarding.  No CVAT 

bilaterally.


MUSCULOSKELETAL: Extremities without clubbing, cyanosis, or edema. No obvious 

deformities. 


NEUROLOGICAL: Awake and alert. No obvious cranial nerve deficits.  Motor 

grossly within normal limits. Five out of 5 muscle strength in the arms and 

legs.  Normal speech.


PSYCHIATRIC: Appropriate mood and affect; insight and judgment normal.





Data


Data


Last Documented VS





Vital Signs








  Date Time  Temp Pulse Resp B/P (MAP) Pulse Ox O2 Delivery O2 Flow Rate FiO2


 


12/3/17 13:53  91 18 148/76 (100) 100 Room Air  


 


12/3/17 12:44 98.0       








Orders





 Orders


Urinalysis - C+S If Indicated (12/3/17 12:51)


Ed Urine Pregnancytest Poc (12/3/17 12:51)


Urine Culture (12/3/17 12:52)


Phenazopyridine (Pyridium) (12/3/17 13:45)


Nitrofurantoin Monohyd Macrocr (Macrobid (12/3/17 13:45)


Ed Discharge Order (12/3/17 13:50)





Labs





Laboratory Tests








Test


  12/3/17


12:52


 


Urine Color YELLOW 


 


Urine Turbidity CLEAR 


 


Urine pH 6.5 


 


Urine Specific Gravity 1.016 


 


Urine Protein NEG mg/dL 


 


Urine Glucose (UA) NEG mg/dL 


 


Urine Ketones NEG mg/dL 


 


Urine Occult Blood NEG 


 


Urine Nitrite NEG 


 


Urine Bilirubin NEG 


 


Urine Urobilinogen


  LESS THAN 2.0


MG/DL


 


Urine Leukocyte Esterase SMALL 


 


Urine RBC 1 /hpf 


 


Urine WBC 15 /hpf 


 


Urine Squamous Epithelial


Cells 1 /hpf 


 


 


Urine Transitional Epithelial


Cells <1 /hpf 


 


 


Urine Mucus FEW /lpf 


 


Microscopic Urinalysis Comment


  CULTURE


INDICATED











MDM


Medical Decision Making


Medical Screen Exam Complete:  Yes


Emergency Medical Condition:  Yes


Interpretation(s)





Laboratory Tests








Test


  12/3/17


12:52


 


Urine Color YELLOW 


 


Urine Turbidity CLEAR 


 


Urine pH 6.5 


 


Urine Specific Gravity 1.016 


 


Urine Protein NEG mg/dL 


 


Urine Glucose (UA) NEG mg/dL 


 


Urine Ketones NEG mg/dL 


 


Urine Occult Blood NEG 


 


Urine Nitrite NEG 


 


Urine Bilirubin NEG 


 


Urine Urobilinogen


  LESS THAN 2.0


MG/DL


 


Urine Leukocyte Esterase SMALL 


 


Urine RBC 1 /hpf 


 


Urine WBC 15 /hpf 


 


Urine Squamous Epithelial


Cells 1 /hpf 


 


 


Urine Transitional Epithelial


Cells <1 /hpf 


 


 


Urine Mucus FEW /lpf 


 


Microscopic Urinalysis Comment


  CULTURE


INDICATED








Vital Signs








  Date Time  Temp Pulse Resp B/P (MAP) Pulse Ox O2 Delivery O2 Flow Rate FiO2


 


12/3/17 12:44 98.0 117 18 106/71 (83) 99   








Differential Diagnosis


UTI versus pyelonephritis versus cystitis versus other


Narrative Course


Patient's 27-year-old female that presented to emergency department for 

evaluation of urinary symptoms.  Patient is mildly tachycardic in triage.  

Physical examination is consistent with urinary tract infection.  Urinalysis 

has reflux culture pending.  Patient will be given Pyridium and Macrobid now.  

She'll be discharged home with these prescriptions.  She is encouraged to 

follow up with her primary doctor return to emergency department for any new or 

worsening symptoms.  Patient verbalized understanding of these instructions.  

Patient is stable for discharge.





Heart rate 91.  Patient stable for discharge.





Diagnosis





 Primary Impression:  


 UTI (urinary tract infection)


 Qualified Codes:  N39.0 - Urinary tract infection, site not specified


Referrals:  


[facility]





Primary Care Physician


Patient Instructions:  General Instructions, Urinary Tract Infection in Women (

ED)





***Additional Instructions:  


Complete full course of antibiotics as prescribed


Increased oral fluid intake


Follow-up with your primary doctor or at the [facility] clinic


Return to emergency department for any new or worsening symptoms


***Med/Other Pt SpecificInfo:  Prescription(s) given


Scripts


Nitrofurantoin Monohydrate Macrocrystals (Macrobid) 100 Mg Cap


100 MG PO BID for Infection, #14 CAP 0 Refills


   Prov: Paulette Natarajan         12/3/17 


Phenazopyridine (Pyridium) 100 Mg Tab


100 MG PO Q8H Y for DYSURIA, #10 TAB 0 Refills


   Prov: Paulette Natarajan         12/3/17


Disposition:  01 DISCHARGE HOME


Condition:  Stable











Paulette Natarajan Dec 3, 2017 13:50

## 2018-01-07 ENCOUNTER — HOSPITAL ENCOUNTER (EMERGENCY)
Dept: HOSPITAL 17 - NEPC | Age: 28
Discharge: HOME | End: 2018-01-07
Payer: COMMERCIAL

## 2018-01-07 VITALS
DIASTOLIC BLOOD PRESSURE: 83 MMHG | OXYGEN SATURATION: 99 % | TEMPERATURE: 99 F | RESPIRATION RATE: 26 BRPM | HEART RATE: 140 BPM | SYSTOLIC BLOOD PRESSURE: 125 MMHG

## 2018-01-07 VITALS — BODY MASS INDEX: 44 KG/M2 | WEIGHT: 218.26 LBS | HEIGHT: 59 IN

## 2018-01-07 VITALS — HEART RATE: 110 BPM | OXYGEN SATURATION: 100 % | RESPIRATION RATE: 18 BRPM

## 2018-01-07 VITALS — RESPIRATION RATE: 16 BRPM

## 2018-01-07 DIAGNOSIS — K80.20: ICD-10-CM
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
## 2018-01-07 DIAGNOSIS — T14.8XXA: ICD-10-CM

## 2018-01-07 DIAGNOSIS — S13.4XXA: Primary | ICD-10-CM

## 2018-01-07 LAB
BASOPHILS # BLD AUTO: 0 TH/MM3 (ref 0–0.2)
BASOPHILS NFR BLD: 0.3 % (ref 0–2)
BUN SERPL-MCNC: 16 MG/DL (ref 7–18)
CALCIUM SERPL-MCNC: 8.5 MG/DL (ref 8.5–10.1)
CHLORIDE SERPL-SCNC: 104 MEQ/L (ref 98–107)
CREAT SERPL-MCNC: 1.03 MG/DL (ref 0.5–1)
EOSINOPHIL # BLD: 0.3 TH/MM3 (ref 0–0.4)
EOSINOPHIL NFR BLD: 3 % (ref 0–4)
ERYTHROCYTE [DISTWIDTH] IN BLOOD BY AUTOMATED COUNT: 17.1 % (ref 11.6–17.2)
GFR SERPLBLD BASED ON 1.73 SQ M-ARVRAT: 79 ML/MIN (ref 89–?)
GLUCOSE SERPL-MCNC: 100 MG/DL (ref 74–106)
HCO3 BLD-SCNC: 24.9 MEQ/L (ref 21–32)
HCT VFR BLD CALC: 35 % (ref 35–46)
HGB BLD-MCNC: 11.5 GM/DL (ref 11.6–15.3)
LYMPHOCYTES # BLD AUTO: 2.3 TH/MM3 (ref 1–4.8)
LYMPHOCYTES NFR BLD AUTO: 27.6 % (ref 9–44)
MCH RBC QN AUTO: 26.3 PG (ref 27–34)
MCHC RBC AUTO-ENTMCNC: 32.8 % (ref 32–36)
MCV RBC AUTO: 80.2 FL (ref 80–100)
MONOCYTE #: 0.4 TH/MM3 (ref 0–0.9)
MONOCYTES NFR BLD: 5.2 % (ref 0–8)
NEUTROPHILS # BLD AUTO: 5.4 TH/MM3 (ref 1.8–7.7)
NEUTROPHILS NFR BLD AUTO: 63.9 % (ref 16–70)
PLATELET # BLD: 374 TH/MM3 (ref 150–450)
PMV BLD AUTO: 7.1 FL (ref 7–11)
RBC # BLD AUTO: 4.37 MIL/MM3 (ref 4–5.3)
SODIUM SERPL-SCNC: 136 MEQ/L (ref 136–145)
WBC # BLD AUTO: 8.4 TH/MM3 (ref 4–11)

## 2018-01-07 PROCEDURE — 72125 CT NECK SPINE W/O DYE: CPT

## 2018-01-07 PROCEDURE — 85025 COMPLETE CBC W/AUTO DIFF WBC: CPT

## 2018-01-07 PROCEDURE — 84703 CHORIONIC GONADOTROPIN ASSAY: CPT

## 2018-01-07 PROCEDURE — 70450 CT HEAD/BRAIN W/O DYE: CPT

## 2018-01-07 PROCEDURE — 71260 CT THORAX DX C+: CPT

## 2018-01-07 PROCEDURE — 99285 EMERGENCY DEPT VISIT HI MDM: CPT

## 2018-01-07 PROCEDURE — 80048 BASIC METABOLIC PNL TOTAL CA: CPT

## 2018-01-07 PROCEDURE — 71045 X-RAY EXAM CHEST 1 VIEW: CPT

## 2018-01-07 PROCEDURE — 74177 CT ABD & PELVIS W/CONTRAST: CPT
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
